# Patient Record
Sex: MALE | Race: WHITE
[De-identification: names, ages, dates, MRNs, and addresses within clinical notes are randomized per-mention and may not be internally consistent; named-entity substitution may affect disease eponyms.]

---

## 2019-01-07 ENCOUNTER — HOSPITAL ENCOUNTER (INPATIENT)
Dept: HOSPITAL 87 - ER | Age: 64
LOS: 3 days | Discharge: HOME | DRG: 720 | End: 2019-01-10
Attending: INTERNAL MEDICINE | Admitting: INTERNAL MEDICINE
Payer: SELF-PAY

## 2019-01-07 VITALS — WEIGHT: 251 LBS | BODY MASS INDEX: 39.39 KG/M2 | HEIGHT: 67 IN

## 2019-01-07 VITALS — DIASTOLIC BLOOD PRESSURE: 77 MMHG | SYSTOLIC BLOOD PRESSURE: 117 MMHG

## 2019-01-07 VITALS — DIASTOLIC BLOOD PRESSURE: 88 MMHG | SYSTOLIC BLOOD PRESSURE: 120 MMHG

## 2019-01-07 DIAGNOSIS — I21.4: ICD-10-CM

## 2019-01-07 DIAGNOSIS — E87.1: ICD-10-CM

## 2019-01-07 DIAGNOSIS — E83.51: ICD-10-CM

## 2019-01-07 DIAGNOSIS — E44.1: ICD-10-CM

## 2019-01-07 DIAGNOSIS — Z87.891: ICD-10-CM

## 2019-01-07 DIAGNOSIS — I25.10: ICD-10-CM

## 2019-01-07 DIAGNOSIS — E87.6: ICD-10-CM

## 2019-01-07 DIAGNOSIS — D63.8: ICD-10-CM

## 2019-01-07 DIAGNOSIS — A41.9: Primary | ICD-10-CM

## 2019-01-07 DIAGNOSIS — I25.2: ICD-10-CM

## 2019-01-07 DIAGNOSIS — J18.9: ICD-10-CM

## 2019-01-07 DIAGNOSIS — I11.0: ICD-10-CM

## 2019-01-07 DIAGNOSIS — I42.9: ICD-10-CM

## 2019-01-07 DIAGNOSIS — I50.40: ICD-10-CM

## 2019-01-07 DIAGNOSIS — Z79.4: ICD-10-CM

## 2019-01-07 DIAGNOSIS — E11.9: ICD-10-CM

## 2019-01-07 LAB
CHLORIDE SERPL-SCNC: 98 MEQ/L (ref 98–107)
CK MB SERPL-CCNC: 1.7 NG/ML (ref 0.5–3.6)
CK SERPL-CCNC: 223 IU/L (ref 39–308)
ERYTHROCYTE [DISTWIDTH] IN BLOOD BY AUTOMATED COUNT: 14.7 % (ref 11.6–14.6)
FOLATE SERPL-MCNC: 10.5 NG/ML (ref 5.38–?)
HCT VFR BLD AUTO: 35.4 % (ref 42–52)
HDLC SERPL-MCNC: 51 MG/DL (ref 40–59)
HGB BLD-MCNC: 11.9 G/DL (ref 14–18)
LDLC SERPL DIRECT ASSAY-MCNC: 63 MG/DL (ref 5–100)
LYMPHOCYTES NFR BLD MANUAL: 12 % (ref 20–50)
MCH RBC QN AUTO: 29.2 PG (ref 28–32)
MCV RBC AUTO: 86.9 FL (ref 80–94)
MONOCYTES NFR BLD MANUAL: 3 % (ref 2–8)
NEUTS BAND NFR BLD MANUAL: 12 % (ref 1–6)
NEUTS SEG NFR BLD MANUAL: 73 % (ref 45–75)
PLATELET # BLD AUTO: 239 X1000/UL (ref 130–400)
PLATELET # BLD EST: NORMAL 10*3/UL
PMV BLD AUTO: 8.8 FL (ref 7.4–10.4)
RBC # BLD AUTO: 4.08 MILL/UL (ref 4.7–6.1)
TIBC SERPL-MCNC: 391 UG/DL (ref 250–450)
VIT B12 SERPL-MCNC: 366 PG/ML (ref 211–911)

## 2019-01-07 PROCEDURE — 93005 ELECTROCARDIOGRAM TRACING: CPT

## 2019-01-07 PROCEDURE — 87804 INFLUENZA ASSAY W/OPTIC: CPT

## 2019-01-07 PROCEDURE — 96375 TX/PRO/DX INJ NEW DRUG ADDON: CPT

## 2019-01-07 PROCEDURE — 82607 VITAMIN B-12: CPT

## 2019-01-07 PROCEDURE — 80048 BASIC METABOLIC PNL TOTAL CA: CPT

## 2019-01-07 PROCEDURE — 36415 COLL VENOUS BLD VENIPUNCTURE: CPT

## 2019-01-07 PROCEDURE — 83036 HEMOGLOBIN GLYCOSYLATED A1C: CPT

## 2019-01-07 PROCEDURE — 93306 TTE W/DOPPLER COMPLETE: CPT

## 2019-01-07 PROCEDURE — 82962 GLUCOSE BLOOD TEST: CPT

## 2019-01-07 PROCEDURE — 80061 LIPID PANEL: CPT

## 2019-01-07 PROCEDURE — 99285 EMERGENCY DEPT VISIT HI MDM: CPT

## 2019-01-07 PROCEDURE — 71045 X-RAY EXAM CHEST 1 VIEW: CPT

## 2019-01-07 PROCEDURE — 93970 EXTREMITY STUDY: CPT

## 2019-01-07 PROCEDURE — 96374 THER/PROPH/DIAG INJ IV PUSH: CPT

## 2019-01-07 PROCEDURE — 93017 CV STRESS TEST TRACING ONLY: CPT

## 2019-01-07 PROCEDURE — 83550 IRON BINDING TEST: CPT

## 2019-01-07 PROCEDURE — 82553 CREATINE MB FRACTION: CPT

## 2019-01-07 PROCEDURE — 83540 ASSAY OF IRON: CPT

## 2019-01-07 PROCEDURE — 82550 ASSAY OF CK (CPK): CPT

## 2019-01-07 PROCEDURE — 84484 ASSAY OF TROPONIN QUANT: CPT

## 2019-01-07 PROCEDURE — 78452 HT MUSCLE IMAGE SPECT MULT: CPT

## 2019-01-07 PROCEDURE — 83880 ASSAY OF NATRIURETIC PEPTIDE: CPT

## 2019-01-07 PROCEDURE — 83605 ASSAY OF LACTIC ACID: CPT

## 2019-01-07 PROCEDURE — 82746 ASSAY OF FOLIC ACID SERUM: CPT

## 2019-01-07 RX ADMIN — METOPROLOL TARTRATE SCH MG: 25 TABLET ORAL at 20:27

## 2019-01-07 RX ADMIN — ENOXAPARIN SODIUM SCH MG: 30 INJECTION SUBCUTANEOUS at 20:02

## 2019-01-07 RX ADMIN — INSULIN LISPRO SCH UNIT: 100 INJECTION, SOLUTION INTRAVENOUS; SUBCUTANEOUS at 20:00

## 2019-01-07 RX ADMIN — FUROSEMIDE SCH MG: 10 INJECTION, SOLUTION INTRAMUSCULAR; INTRAVENOUS at 20:02

## 2019-01-07 RX ADMIN — SPIRONOLACTONE SCH MG: 25 TABLET ORAL at 20:31

## 2019-01-07 RX ADMIN — Medication SCH STRIP: at 20:03

## 2019-01-07 RX ADMIN — OXYCODONE HYDROCHLORIDE AND ACETAMINOPHEN SCH MG: 500 TABLET ORAL at 20:03

## 2019-01-07 RX ADMIN — Medication SCH STRIP: at 20:28

## 2019-01-07 RX ADMIN — INSULIN LISPRO SCH UNIT: 100 INJECTION, SOLUTION INTRAVENOUS; SUBCUTANEOUS at 20:29

## 2019-01-07 RX ADMIN — FAMOTIDINE SCH MG: 20 TABLET ORAL at 20:30

## 2019-01-08 VITALS — DIASTOLIC BLOOD PRESSURE: 76 MMHG | SYSTOLIC BLOOD PRESSURE: 111 MMHG

## 2019-01-08 VITALS — DIASTOLIC BLOOD PRESSURE: 81 MMHG | SYSTOLIC BLOOD PRESSURE: 112 MMHG

## 2019-01-08 VITALS — DIASTOLIC BLOOD PRESSURE: 68 MMHG | SYSTOLIC BLOOD PRESSURE: 107 MMHG

## 2019-01-08 VITALS — SYSTOLIC BLOOD PRESSURE: 110 MMHG | DIASTOLIC BLOOD PRESSURE: 79 MMHG

## 2019-01-08 VITALS — SYSTOLIC BLOOD PRESSURE: 122 MMHG | DIASTOLIC BLOOD PRESSURE: 69 MMHG

## 2019-01-08 VITALS — DIASTOLIC BLOOD PRESSURE: 73 MMHG | SYSTOLIC BLOOD PRESSURE: 103 MMHG

## 2019-01-08 LAB
CK MB SERPL-CCNC: 2.3 NG/ML (ref 0.5–3.6)
CK SERPL-CCNC: 246 IU/L (ref 39–308)

## 2019-01-08 RX ADMIN — INSULIN LISPRO SCH UNIT: 100 INJECTION, SOLUTION INTRAVENOUS; SUBCUTANEOUS at 05:56

## 2019-01-08 RX ADMIN — METOPROLOL TARTRATE SCH MG: 25 TABLET ORAL at 20:57

## 2019-01-08 RX ADMIN — FUROSEMIDE SCH MG: 10 INJECTION, SOLUTION INTRAMUSCULAR; INTRAVENOUS at 05:56

## 2019-01-08 RX ADMIN — SPIRONOLACTONE SCH MG: 25 TABLET ORAL at 09:12

## 2019-01-08 RX ADMIN — SPIRONOLACTONE SCH MG: 25 TABLET ORAL at 20:08

## 2019-01-08 RX ADMIN — OXYCODONE HYDROCHLORIDE AND ACETAMINOPHEN SCH MG: 500 TABLET ORAL at 20:08

## 2019-01-08 RX ADMIN — ASPIRIN SCH MG: 325 TABLET, COATED ORAL at 09:12

## 2019-01-08 RX ADMIN — INSULIN LISPRO SCH UNIT: 100 INJECTION, SOLUTION INTRAVENOUS; SUBCUTANEOUS at 17:34

## 2019-01-08 RX ADMIN — ENOXAPARIN SODIUM SCH MG: 120 INJECTION SUBCUTANEOUS at 21:03

## 2019-01-08 RX ADMIN — Medication SCH STRIP: at 05:56

## 2019-01-08 RX ADMIN — Medication SCH STRIP: at 17:34

## 2019-01-08 RX ADMIN — FUROSEMIDE SCH MG: 10 INJECTION, SOLUTION INTRAMUSCULAR; INTRAVENOUS at 17:15

## 2019-01-08 RX ADMIN — Medication SCH STRIP: at 13:06

## 2019-01-08 RX ADMIN — ENOXAPARIN SODIUM SCH MG: 30 INJECTION SUBCUTANEOUS at 09:11

## 2019-01-08 RX ADMIN — FAMOTIDINE SCH MG: 20 TABLET ORAL at 20:08

## 2019-01-08 RX ADMIN — FAMOTIDINE SCH MG: 20 TABLET ORAL at 09:12

## 2019-01-08 RX ADMIN — FUROSEMIDE SCH MG: 10 INJECTION, SOLUTION INTRAMUSCULAR; INTRAVENOUS at 17:48

## 2019-01-08 RX ADMIN — INSULIN LISPRO SCH UNIT: 100 INJECTION, SOLUTION INTRAVENOUS; SUBCUTANEOUS at 13:08

## 2019-01-08 RX ADMIN — OXYCODONE HYDROCHLORIDE AND ACETAMINOPHEN SCH MG: 500 TABLET ORAL at 09:12

## 2019-01-08 RX ADMIN — Medication SCH MLS/HR: at 20:25

## 2019-01-08 RX ADMIN — Medication SCH STRIP: at 20:57

## 2019-01-08 RX ADMIN — INSULIN LISPRO SCH UNIT: 100 INJECTION, SOLUTION INTRAVENOUS; SUBCUTANEOUS at 20:57

## 2019-01-08 RX ADMIN — METOPROLOL TARTRATE SCH MG: 25 TABLET ORAL at 09:11

## 2019-01-09 VITALS — SYSTOLIC BLOOD PRESSURE: 105 MMHG | DIASTOLIC BLOOD PRESSURE: 69 MMHG

## 2019-01-09 VITALS — SYSTOLIC BLOOD PRESSURE: 102 MMHG | DIASTOLIC BLOOD PRESSURE: 73 MMHG

## 2019-01-09 VITALS — DIASTOLIC BLOOD PRESSURE: 69 MMHG | SYSTOLIC BLOOD PRESSURE: 111 MMHG

## 2019-01-09 VITALS — DIASTOLIC BLOOD PRESSURE: 72 MMHG | SYSTOLIC BLOOD PRESSURE: 122 MMHG

## 2019-01-09 VITALS — SYSTOLIC BLOOD PRESSURE: 111 MMHG | DIASTOLIC BLOOD PRESSURE: 66 MMHG

## 2019-01-09 VITALS — SYSTOLIC BLOOD PRESSURE: 111 MMHG | DIASTOLIC BLOOD PRESSURE: 78 MMHG

## 2019-01-09 LAB
CK MB SERPL-CCNC: 1.4 NG/ML (ref 0.5–3.6)
CK SERPL-CCNC: 193 IU/L (ref 39–308)
INR PPP: 1.1
PROTHROMBIN TIME: 10.8 SEC (ref 9.1–11.1)

## 2019-01-09 RX ADMIN — INSULIN LISPRO SCH UNIT: 100 INJECTION, SOLUTION INTRAVENOUS; SUBCUTANEOUS at 21:07

## 2019-01-09 RX ADMIN — Medication SCH STRIP: at 12:39

## 2019-01-09 RX ADMIN — Medication SCH MLS/HR: at 22:38

## 2019-01-09 RX ADMIN — SPIRONOLACTONE SCH MG: 25 TABLET ORAL at 21:08

## 2019-01-09 RX ADMIN — ENOXAPARIN SODIUM SCH MG: 120 INJECTION SUBCUTANEOUS at 09:39

## 2019-01-09 RX ADMIN — OXYCODONE HYDROCHLORIDE AND ACETAMINOPHEN SCH MG: 500 TABLET ORAL at 09:37

## 2019-01-09 RX ADMIN — INSULIN LISPRO SCH UNIT: 100 INJECTION, SOLUTION INTRAVENOUS; SUBCUTANEOUS at 12:40

## 2019-01-09 RX ADMIN — ASPIRIN SCH MG: 325 TABLET, COATED ORAL at 09:38

## 2019-01-09 RX ADMIN — ENOXAPARIN SODIUM SCH MG: 120 INJECTION SUBCUTANEOUS at 10:49

## 2019-01-09 RX ADMIN — METOPROLOL TARTRATE SCH MG: 25 TABLET ORAL at 09:38

## 2019-01-09 RX ADMIN — FAMOTIDINE SCH MG: 20 TABLET ORAL at 09:37

## 2019-01-09 RX ADMIN — Medication SCH STRIP: at 17:10

## 2019-01-09 RX ADMIN — Medication SCH STRIP: at 06:12

## 2019-01-09 RX ADMIN — FUROSEMIDE SCH MG: 10 INJECTION, SOLUTION INTRAMUSCULAR; INTRAVENOUS at 06:14

## 2019-01-09 RX ADMIN — ENOXAPARIN SODIUM SCH MG: 120 INJECTION SUBCUTANEOUS at 21:08

## 2019-01-09 RX ADMIN — FUROSEMIDE SCH MG: 10 INJECTION, SOLUTION INTRAMUSCULAR; INTRAVENOUS at 18:30

## 2019-01-09 RX ADMIN — FAMOTIDINE SCH MG: 20 TABLET ORAL at 21:08

## 2019-01-09 RX ADMIN — METOPROLOL TARTRATE SCH MG: 25 TABLET ORAL at 21:08

## 2019-01-09 RX ADMIN — SPIRONOLACTONE SCH MG: 25 TABLET ORAL at 09:38

## 2019-01-09 RX ADMIN — INSULIN LISPRO SCH UNIT: 100 INJECTION, SOLUTION INTRAVENOUS; SUBCUTANEOUS at 06:12

## 2019-01-09 RX ADMIN — INSULIN LISPRO SCH UNIT: 100 INJECTION, SOLUTION INTRAVENOUS; SUBCUTANEOUS at 17:40

## 2019-01-09 RX ADMIN — Medication SCH STRIP: at 21:09

## 2019-01-09 RX ADMIN — OXYCODONE HYDROCHLORIDE AND ACETAMINOPHEN SCH MG: 500 TABLET ORAL at 21:08

## 2019-01-10 VITALS — DIASTOLIC BLOOD PRESSURE: 71 MMHG | SYSTOLIC BLOOD PRESSURE: 102 MMHG

## 2019-01-10 VITALS — SYSTOLIC BLOOD PRESSURE: 130 MMHG | DIASTOLIC BLOOD PRESSURE: 87 MMHG

## 2019-01-10 VITALS — SYSTOLIC BLOOD PRESSURE: 115 MMHG | DIASTOLIC BLOOD PRESSURE: 81 MMHG

## 2019-01-10 VITALS — DIASTOLIC BLOOD PRESSURE: 73 MMHG | SYSTOLIC BLOOD PRESSURE: 115 MMHG

## 2019-01-10 VITALS — DIASTOLIC BLOOD PRESSURE: 88 MMHG | SYSTOLIC BLOOD PRESSURE: 129 MMHG

## 2019-01-10 LAB
BASOPHILS NFR BLD AUTO: 0.7 % (ref 0–2)
CHLORIDE SERPL-SCNC: 101 MEQ/L (ref 98–107)
EOSINOPHIL NFR BLD AUTO: 4.8 % (ref 0–5)
ERYTHROCYTE [DISTWIDTH] IN BLOOD BY AUTOMATED COUNT: 14.6 % (ref 11.6–14.6)
HCT VFR BLD AUTO: 38.1 % (ref 42–52)
HGB BLD-MCNC: 12.6 G/DL (ref 14–18)
LYMPHOCYTES NFR BLD AUTO: 31.2 % (ref 20–50)
MCH RBC QN AUTO: 29 PG (ref 28–32)
MCV RBC AUTO: 87.7 FL (ref 80–94)
MONOCYTES NFR BLD AUTO: 9.9 % (ref 2–8)
NEUTROPHILS NFR BLD AUTO: 53.4 % (ref 40–76)
PLATELET # BLD AUTO: 284 X1000/UL (ref 130–400)
PMV BLD AUTO: 8.5 FL (ref 7.4–10.4)
RBC # BLD AUTO: 4.34 MILL/UL (ref 4.7–6.1)

## 2019-01-10 RX ADMIN — Medication SCH STRIP: at 06:00

## 2019-01-10 RX ADMIN — CEFTRIAXONE SCH MLS/HR: 1 INJECTION, SOLUTION INTRAVENOUS at 10:16

## 2019-01-10 RX ADMIN — ENOXAPARIN SODIUM SCH MG: 120 INJECTION SUBCUTANEOUS at 10:15

## 2019-01-10 RX ADMIN — FAMOTIDINE SCH MG: 20 TABLET ORAL at 09:00

## 2019-01-10 RX ADMIN — METOPROLOL TARTRATE SCH MG: 25 TABLET ORAL at 09:00

## 2019-01-10 RX ADMIN — Medication SCH STRIP: at 12:33

## 2019-01-10 RX ADMIN — ASPIRIN SCH MG: 325 TABLET, COATED ORAL at 09:00

## 2019-01-10 RX ADMIN — SPIRONOLACTONE SCH MG: 25 TABLET ORAL at 09:00

## 2019-01-10 RX ADMIN — OXYCODONE HYDROCHLORIDE AND ACETAMINOPHEN SCH MG: 500 TABLET ORAL at 09:00

## 2019-01-10 RX ADMIN — FUROSEMIDE SCH MG: 10 INJECTION, SOLUTION INTRAMUSCULAR; INTRAVENOUS at 05:53

## 2019-01-10 RX ADMIN — INSULIN LISPRO SCH UNIT: 100 INJECTION, SOLUTION INTRAVENOUS; SUBCUTANEOUS at 06:01

## 2020-03-02 ENCOUNTER — HOSPITAL ENCOUNTER (INPATIENT)
Dept: HOSPITAL 72 - EMR | Age: 65
LOS: 8 days | Discharge: NURSING, REHAB OR LONG-TERM CARE | DRG: 198 | End: 2020-03-10
Payer: MEDICAID

## 2020-03-02 VITALS — SYSTOLIC BLOOD PRESSURE: 152 MMHG | DIASTOLIC BLOOD PRESSURE: 92 MMHG

## 2020-03-02 VITALS — DIASTOLIC BLOOD PRESSURE: 89 MMHG | SYSTOLIC BLOOD PRESSURE: 148 MMHG

## 2020-03-02 VITALS — DIASTOLIC BLOOD PRESSURE: 96 MMHG | SYSTOLIC BLOOD PRESSURE: 168 MMHG

## 2020-03-02 VITALS — SYSTOLIC BLOOD PRESSURE: 149 MMHG | DIASTOLIC BLOOD PRESSURE: 93 MMHG

## 2020-03-02 VITALS — HEIGHT: 66 IN | WEIGHT: 213 LBS | BODY MASS INDEX: 34.23 KG/M2

## 2020-03-02 VITALS — SYSTOLIC BLOOD PRESSURE: 143 MMHG | DIASTOLIC BLOOD PRESSURE: 91 MMHG

## 2020-03-02 VITALS — DIASTOLIC BLOOD PRESSURE: 102 MMHG | SYSTOLIC BLOOD PRESSURE: 158 MMHG

## 2020-03-02 DIAGNOSIS — N20.0: ICD-10-CM

## 2020-03-02 DIAGNOSIS — K57.90: ICD-10-CM

## 2020-03-02 DIAGNOSIS — I50.23: ICD-10-CM

## 2020-03-02 DIAGNOSIS — E87.2: ICD-10-CM

## 2020-03-02 DIAGNOSIS — R74.0: ICD-10-CM

## 2020-03-02 DIAGNOSIS — I42.9: ICD-10-CM

## 2020-03-02 DIAGNOSIS — I11.0: ICD-10-CM

## 2020-03-02 DIAGNOSIS — R94.5: ICD-10-CM

## 2020-03-02 DIAGNOSIS — I24.9: Primary | ICD-10-CM

## 2020-03-02 DIAGNOSIS — I47.1: ICD-10-CM

## 2020-03-02 DIAGNOSIS — D62: ICD-10-CM

## 2020-03-02 DIAGNOSIS — K22.6: ICD-10-CM

## 2020-03-02 DIAGNOSIS — K76.0: ICD-10-CM

## 2020-03-02 LAB
ADD MANUAL DIFF: NO
ALBUMIN SERPL-MCNC: 3.3 G/DL (ref 3.4–5)
ALBUMIN/GLOB SERPL: 0.8 {RATIO} (ref 1–2.7)
ALP SERPL-CCNC: 86 U/L (ref 46–116)
ALT SERPL-CCNC: 369 U/L (ref 12–78)
ANION GAP SERPL CALC-SCNC: 29 MMOL/L (ref 5–15)
AST SERPL-CCNC: 485 U/L (ref 15–37)
BASOPHILS NFR BLD AUTO: 0.8 % (ref 0–2)
BILIRUB DIRECT SERPL-MCNC: 0.4 MG/DL (ref 0–0.3)
BILIRUB SERPL-MCNC: 1.6 MG/DL (ref 0.2–1)
BUN SERPL-MCNC: 16 MG/DL (ref 7–18)
CALCIUM SERPL-MCNC: 8.2 MG/DL (ref 8.5–10.1)
CHLORIDE SERPL-SCNC: 97 MMOL/L (ref 98–107)
CO2 SERPL-SCNC: 13 MMOL/L (ref 21–32)
CREAT SERPL-MCNC: 0.9 MG/DL (ref 0.55–1.3)
EOSINOPHIL NFR BLD AUTO: 0 % (ref 0–3)
ERYTHROCYTE [DISTWIDTH] IN BLOOD BY AUTOMATED COUNT: 14.7 % (ref 11.6–14.8)
GLOBULIN SER-MCNC: 4.2 G/DL
HCT VFR BLD CALC: 43.7 % (ref 42–52)
HGB BLD-MCNC: 14.5 G/DL (ref 14.2–18)
LYMPHOCYTES NFR BLD AUTO: 14.1 % (ref 20–45)
MCV RBC AUTO: 93 FL (ref 80–99)
MONOCYTES NFR BLD AUTO: 3.7 % (ref 1–10)
NEUTROPHILS NFR BLD AUTO: 81.4 % (ref 45–75)
PLATELET # BLD: 239 K/UL (ref 150–450)
POTASSIUM SERPL-SCNC: 3.4 MMOL/L (ref 3.5–5.1)
RBC # BLD AUTO: 4.71 M/UL (ref 4.7–6.1)
SODIUM SERPL-SCNC: 139 MMOL/L (ref 136–145)
WBC # BLD AUTO: 10.1 K/UL (ref 4.8–10.8)

## 2020-03-02 PROCEDURE — 86709 HEPATITIS A IGM ANTIBODY: CPT

## 2020-03-02 PROCEDURE — 76700 US EXAM ABDOM COMPLETE: CPT

## 2020-03-02 PROCEDURE — 87340 HEPATITIS B SURFACE AG IA: CPT

## 2020-03-02 PROCEDURE — 93017 CV STRESS TEST TRACING ONLY: CPT

## 2020-03-02 PROCEDURE — 94150 VITAL CAPACITY TEST: CPT

## 2020-03-02 PROCEDURE — 86803 HEPATITIS C AB TEST: CPT

## 2020-03-02 PROCEDURE — 71045 X-RAY EXAM CHEST 1 VIEW: CPT

## 2020-03-02 PROCEDURE — 85610 PROTHROMBIN TIME: CPT

## 2020-03-02 PROCEDURE — 94003 VENT MGMT INPAT SUBQ DAY: CPT

## 2020-03-02 PROCEDURE — 82962 GLUCOSE BLOOD TEST: CPT

## 2020-03-02 PROCEDURE — 78452 HT MUSCLE IMAGE SPECT MULT: CPT

## 2020-03-02 PROCEDURE — 74177 CT ABD & PELVIS W/CONTRAST: CPT

## 2020-03-02 PROCEDURE — 80053 COMPREHEN METABOLIC PANEL: CPT

## 2020-03-02 PROCEDURE — 36415 COLL VENOUS BLD VENIPUNCTURE: CPT

## 2020-03-02 PROCEDURE — 93306 TTE W/DOPPLER COMPLETE: CPT

## 2020-03-02 PROCEDURE — 96375 TX/PRO/DX INJ NEW DRUG ADDON: CPT

## 2020-03-02 PROCEDURE — 85730 THROMBOPLASTIN TIME PARTIAL: CPT

## 2020-03-02 PROCEDURE — 83735 ASSAY OF MAGNESIUM: CPT

## 2020-03-02 PROCEDURE — 84100 ASSAY OF PHOSPHORUS: CPT

## 2020-03-02 PROCEDURE — 85025 COMPLETE CBC W/AUTO DIFF WBC: CPT

## 2020-03-02 PROCEDURE — 82248 BILIRUBIN DIRECT: CPT

## 2020-03-02 PROCEDURE — 87081 CULTURE SCREEN ONLY: CPT

## 2020-03-02 PROCEDURE — 99285 EMERGENCY DEPT VISIT HI MDM: CPT

## 2020-03-02 PROCEDURE — 83880 ASSAY OF NATRIURETIC PEPTIDE: CPT

## 2020-03-02 PROCEDURE — 96374 THER/PROPH/DIAG INJ IV PUSH: CPT

## 2020-03-02 PROCEDURE — 84484 ASSAY OF TROPONIN QUANT: CPT

## 2020-03-02 PROCEDURE — 93970 EXTREMITY STUDY: CPT

## 2020-03-02 PROCEDURE — 93005 ELECTROCARDIOGRAM TRACING: CPT

## 2020-03-02 PROCEDURE — 96376 TX/PRO/DX INJ SAME DRUG ADON: CPT

## 2020-03-02 PROCEDURE — 86705 HEP B CORE ANTIBODY IGM: CPT

## 2020-03-02 PROCEDURE — 83690 ASSAY OF LIPASE: CPT

## 2020-03-02 RX ADMIN — LORAZEPAM PRN MG: 2 INJECTION, SOLUTION INTRAMUSCULAR; INTRAVENOUS at 23:37

## 2020-03-02 RX ADMIN — NITROGLYCERIN PRN MG: 0.4 TABLET SUBLINGUAL at 18:19

## 2020-03-02 RX ADMIN — NITROGLYCERIN PRN MG: 0.4 TABLET SUBLINGUAL at 18:14

## 2020-03-02 RX ADMIN — SODIUM CHLORIDE PRN MG: 9 INJECTION, SOLUTION INTRAVENOUS at 21:54

## 2020-03-02 RX ADMIN — HEPARIN SODIUM SCH UNITS: 5000 INJECTION INTRAVENOUS; SUBCUTANEOUS at 21:52

## 2020-03-02 RX ADMIN — DEXTROSE AND SODIUM CHLORIDE SCH MLS/HR: 5; .45 INJECTION, SOLUTION INTRAVENOUS at 21:00

## 2020-03-02 RX ADMIN — DOCUSATE SODIUM SCH MG: 100 CAPSULE, LIQUID FILLED ORAL at 21:52

## 2020-03-02 NOTE — DIAGNOSTIC IMAGING REPORT
Indication:  Abdominal pain

 

Technique: Grayscale and duplex Doppler imaging of the abdomen performed.

 

Comparison: None

 

Findings: Exam is limited by body habitus and bowel gas.

 

The liver is echogenic consistent with fatty infiltration. Doppler interrogation of

the main portal vein shows patency with hepatopedal, monophasic flow. There is no

biliary ductal dilatation identified. Gallbladder is unremarkable. No obvious stones

identified in the gallbladder.

 

Pancreas aorta and IVC are not seen.

 

There is no hydronephrosis. There may be a small stone in the right kidney

nonobstructive.

 

IMPRESSION:

 

Fatty liver

 

Suspected nonobstructive stone right kidney.

 

Limited evaluation due to bowel gas

## 2020-03-02 NOTE — DIAGNOSTIC IMAGING REPORT
Indication: Dyspnea

 

Comparison:  None

 

A single view chest radiograph was obtained.

 

Findings:

 

No definite infiltrate or pulmonary vascular congestion identified. Single lead

pacemaker demonstrated in the left anterior chest wall. The heart is enlarged. The

aorta is mildly enlarged consistent with atherosclerotic vascular disease.  The bones

are osteopenic. There are thoracic vertebral enthesophytes at multiple levels.

 

Impression:

 

No acute disease

## 2020-03-02 NOTE — EMERGENCY ROOM REPORT
History of Present Illness


General


Chief Complaint:  Chest Pain





Present Illness


HPI


Patient is a 67-year-old male presents after increased chest pain for the past 

3 days.  Prior history of congestive heart failure and pacemaker placement.  No 

prior history of coronary artery disease.  He normally takes carvedilol as well 

as Lasix twice a day.  Reports having increased pain at night.  States that he 

had been having some increased difficulty with breathing.  He had been given 

nitroglycerin as well as aspirin by paramedics minimal improvement.  Stepped 

from a bus.  He is normally ambulatory with a walker.Patient had onset of 

symptoms yesterday.  Patient had reportedly been compliant with his medications 

which include Lasix as well as medications for high cholesterol and 

hypertension.


Allergies:  


Coded Allergies:  


     No Known Allergies (Unverified , 3/2/20)





Patient History


Past Medical History:  see triage record


Past Surgical History:  pacemaker


Reviewed Nursing Documentation:  PMH: Agreed; PSxH: Agreed





Review of Systems


All Other Systems:  negative except mentioned in HPI





Physical Exam





Vital Signs








  Date Time  Temp Pulse Resp B/P (MAP) Pulse Ox O2 Delivery O2 Flow Rate FiO2


 


3/2/20 15:07 98.2 110 16 168/96 (120) 96 Room Air  








Sp02 EP Interpretation:  reviewed, normal


General Appearance:  alert, GCS 15, obese


Head:  atraumatic


ENT:  normal ENT inspection, hearing grossly normal, normal voice


Neck:  normal inspection, full range of motion, supple, no bony tend


Respiratory:  normal inspection, no respiratory distress, no retraction


Cardiovascular #1:  regular rate, rhythm, no edema


Gastrointestinal:  normal inspection, normal bowel sounds, non tender, soft, no 

guarding, no hernia


Genitourinary:  no CVA tenderness


Musculoskeletal:  normal inspection, back normal, normal range of motion


Neurologic:  alert, motor strength/tone normal, CNs III-XII nml as tested, 

oriented x3, responsive, speech normal, normal inspection


Psychiatric:  normal inspection, judgement/insight normal, mood/affect normal





Medical Decision Making


Diagnostic Impression:  


 Primary Impression:  


 Chest pain


 Additional Impressions:  


 Abnormal LFTs


 Metabolic acidosis


 Elevated troponin


ER Course


Patient presented for chest pain.  Differential diagnosis included but was not 

limited to acute coronary syndrome, pulmonary embolism, pneumonia, aortic 

dissection, shingles, pneumothorax, aortic dissection, esophageal rupture, 

pericarditis.  Because of complexity of patient's case laboratory tests and 

imaging studies were ordered.





EKG showed Sinus tachycardia with a rate of 105 without acute ST or T wave 

changes.


CXR showed cardiomegaly without infiltrate.  





Patient was given lasix and aspirin.  


Dr. Webster was contacted for admission due to covering physician for Dr. Krishnamurthy.





Labs








Test


  3/2/20


15:45 3/2/20


18:35


 


White Blood Count


  10.1 K/UL


(4.8-10.8) 


 


 


Red Blood Count


  4.71 M/UL


(4.70-6.10) 


 


 


Hemoglobin


  14.5 G/DL


(14.2-18.0) 


 


 


Hematocrit


  43.7 %


(42.0-52.0) 


 


 


Mean Corpuscular Volume 93 FL (80-99)  


 


Mean Corpuscular Hemoglobin


  30.7 PG


(27.0-31.0) 


 


 


Mean Corpuscular Hemoglobin


Concent 33.1 G/DL


(32.0-36.0) 


 


 


Red Cell Distribution Width


  14.7 %


(11.6-14.8) 


 


 


Platelet Count


  239 K/UL


(150-450) 


 


 


Mean Platelet Volume


  6.2 FL


(6.5-10.1) 


 


 


Neutrophils (%) (Auto)


  81.4 %


(45.0-75.0) 


 


 


Lymphocytes (%) (Auto)


  14.1 %


(20.0-45.0) 


 


 


Monocytes (%) (Auto)


  3.7 %


(1.0-10.0) 


 


 


Eosinophils (%) (Auto)


  0.0 %


(0.0-3.0) 


 


 


Basophils (%) (Auto)


  0.8 %


(0.0-2.0) 


 


 


Sodium Level


  139 MMOL/L


(136-145) 


 


 


Potassium Level


  3.4 MMOL/L


(3.5-5.1) 


 


 


Chloride Level


  97 MMOL/L


() 


 


 


Carbon Dioxide Level


  13 MMOL/L


(21-32) 


 


 


Anion Gap


  29 mmol/L


(5-15) 


 


 


Blood Urea Nitrogen


  16 mg/dL


(7-18) 


 


 


Creatinine


  0.9 MG/DL


(0.55-1.30) 


 


 


Estimat Glomerular Filtration


Rate > 60 mL/min


(>60) 


 


 


Glucose Level


  154 MG/DL


() 


 


 


Calcium Level


  8.2 MG/DL


(8.5-10.1) 


 


 


Total Bilirubin


  1.6 MG/DL


(0.2-1.0) 


 


 


Direct Bilirubin


  0.4 MG/DL


(0.0-0.3) 


 


 


Aspartate Amino Transf


(AST/SGOT) 485 U/L


(15-37) 


 


 


Alanine Aminotransferase


(ALT/SGPT) 369 U/L


(12-78) 


 


 


Alkaline Phosphatase


  86 U/L


() 


 


 


Pro-B-Type Natriuretic Peptide


  904 pg/mL


(0-125) 


 


 


Total Protein


  7.5 G/DL


(6.4-8.2) 


 


 


Albumin


  3.3 G/DL


(3.4-5.0) 


 


 


Globulin 4.2 g/dL  


 


Albumin/Globulin Ratio 0.8 (1.0-2.7)  


 


Lipase


  328 U/L


() 


 


 


Troponin I


  


  0.117 ng/mL


(0.000-0.056)








EKG Diagnostic Results


Rate:  normal


Rhythm:  NSR


ST Segments:  no acute changes





Last Vital Signs








  Date Time  Temp Pulse Resp B/P (MAP) Pulse Ox O2 Delivery O2 Flow Rate FiO2


 


3/2/20 15:07 98.2 110 16 168/96 (120) 96 Room Air  








Status:  improved


Disposition:  ADMITTED AS INPATIENT


Condition:  Stable


Scripts


Unable to Obtain Active Prescriptions or Reported Meds











Faheem Kinney MD Mar 2, 2020 15:12

## 2020-03-03 VITALS — DIASTOLIC BLOOD PRESSURE: 81 MMHG | SYSTOLIC BLOOD PRESSURE: 134 MMHG

## 2020-03-03 VITALS — DIASTOLIC BLOOD PRESSURE: 84 MMHG | SYSTOLIC BLOOD PRESSURE: 139 MMHG

## 2020-03-03 VITALS — DIASTOLIC BLOOD PRESSURE: 79 MMHG | SYSTOLIC BLOOD PRESSURE: 134 MMHG

## 2020-03-03 VITALS — DIASTOLIC BLOOD PRESSURE: 113 MMHG | SYSTOLIC BLOOD PRESSURE: 160 MMHG

## 2020-03-03 VITALS — DIASTOLIC BLOOD PRESSURE: 77 MMHG | SYSTOLIC BLOOD PRESSURE: 133 MMHG

## 2020-03-03 VITALS — SYSTOLIC BLOOD PRESSURE: 146 MMHG | DIASTOLIC BLOOD PRESSURE: 98 MMHG

## 2020-03-03 LAB
ADD MANUAL DIFF: NO
ALBUMIN SERPL-MCNC: 3.5 G/DL (ref 3.4–5)
ALBUMIN/GLOB SERPL: 0.8 {RATIO} (ref 1–2.7)
ALP SERPL-CCNC: 79 U/L (ref 46–116)
ALT SERPL-CCNC: 294 U/L (ref 12–78)
ANION GAP SERPL CALC-SCNC: 19 MMOL/L (ref 5–15)
APTT BLD: 25 SEC (ref 23–33)
AST SERPL-CCNC: 313 U/L (ref 15–37)
BASOPHILS NFR BLD AUTO: 1.7 % (ref 0–2)
BILIRUB DIRECT SERPL-MCNC: 0.3 MG/DL (ref 0–0.3)
BILIRUB SERPL-MCNC: 1.7 MG/DL (ref 0.2–1)
BUN SERPL-MCNC: 10 MG/DL (ref 7–18)
CALCIUM SERPL-MCNC: 8.5 MG/DL (ref 8.5–10.1)
CHLORIDE SERPL-SCNC: 97 MMOL/L (ref 98–107)
CO2 SERPL-SCNC: 22 MMOL/L (ref 21–32)
CREAT SERPL-MCNC: 0.7 MG/DL (ref 0.55–1.3)
EOSINOPHIL NFR BLD AUTO: 0.7 % (ref 0–3)
ERYTHROCYTE [DISTWIDTH] IN BLOOD BY AUTOMATED COUNT: 12.7 % (ref 11.6–14.8)
GLOBULIN SER-MCNC: 4.4 G/DL
HCT VFR BLD CALC: 43.6 % (ref 42–52)
HGB BLD-MCNC: 15.1 G/DL (ref 14.2–18)
INR PPP: 1 (ref 0.9–1.1)
LYMPHOCYTES NFR BLD AUTO: 41.7 % (ref 20–45)
MCV RBC AUTO: 90 FL (ref 80–99)
MONOCYTES NFR BLD AUTO: 12.2 % (ref 1–10)
NEUTROPHILS NFR BLD AUTO: 43.8 % (ref 45–75)
PLATELET # BLD: 223 K/UL (ref 150–450)
POTASSIUM SERPL-SCNC: 3.5 MMOL/L (ref 3.5–5.1)
RBC # BLD AUTO: 4.85 M/UL (ref 4.7–6.1)
SODIUM SERPL-SCNC: 138 MMOL/L (ref 136–145)
WBC # BLD AUTO: 6.4 K/UL (ref 4.8–10.8)

## 2020-03-03 RX ADMIN — PANTOPRAZOLE SODIUM SCH MG: 40 INJECTION, POWDER, FOR SOLUTION INTRAVENOUS at 09:14

## 2020-03-03 RX ADMIN — DEXTROSE AND SODIUM CHLORIDE SCH MLS/HR: 5; .45 INJECTION, SOLUTION INTRAVENOUS at 23:58

## 2020-03-03 RX ADMIN — CARVEDILOL SCH MG: 6.25 TABLET, FILM COATED ORAL at 09:15

## 2020-03-03 RX ADMIN — DEXTROSE AND SODIUM CHLORIDE SCH MLS/HR: 5; .45 INJECTION, SOLUTION INTRAVENOUS at 09:25

## 2020-03-03 RX ADMIN — LORAZEPAM PRN MG: 2 INJECTION, SOLUTION INTRAMUSCULAR; INTRAVENOUS at 06:10

## 2020-03-03 RX ADMIN — DOCUSATE SODIUM SCH MG: 100 CAPSULE, LIQUID FILLED ORAL at 21:04

## 2020-03-03 RX ADMIN — DIPHENHYDRAMINE HYDROCHLORIDE PRN MG: 50 INJECTION INTRAMUSCULAR; INTRAVENOUS at 01:26

## 2020-03-03 RX ADMIN — SODIUM CHLORIDE PRN MG: 9 INJECTION, SOLUTION INTRAVENOUS at 21:05

## 2020-03-03 RX ADMIN — LOSARTAN POTASSIUM SCH MG: 25 TABLET, FILM COATED ORAL at 09:15

## 2020-03-03 RX ADMIN — FUROSEMIDE SCH MG: 40 TABLET ORAL at 09:15

## 2020-03-03 RX ADMIN — DOCUSATE SODIUM SCH MG: 100 CAPSULE, LIQUID FILLED ORAL at 09:14

## 2020-03-03 RX ADMIN — HEPARIN SODIUM SCH UNITS: 5000 INJECTION INTRAVENOUS; SUBCUTANEOUS at 09:16

## 2020-03-03 NOTE — GI INITIAL CONSULT NOTE
History of Present Illness


General


Date patient seen:  Mar 3, 2020


Time patient seen:  10:59


Reason for Hospitalization:  Chest Pain


Referring physician:  LAKISHA


Reason for Consultation:  ABNORMAL LFTs





Present Illness


HPI


Patient is a 67-year-old male presents after increased chest pain for the past 

3 days.  Prior history of congestive heart failure and pacemaker placement.  No 

prior history of coronary artery disease.  He normally takes carvedilol as well 

as Lasix twice a day.  Reports having increased pain at night.  States that he 

had been having some increased difficulty with breathing.  He had been given 

nitroglycerin as well as aspirin by paramedics minimal improvement.  Stepped 

from a bus.  He is normally ambulatory with a walker.Patient had onset of 

symptoms yesterday.  Patient had reportedly been compliant with his medications 

which include Lasix as well as medications for high cholesterol and 

hypertension.


GI consulted for reported abnormal LFTs.  Patient seen, awake alert oriented x4 

no apparent distress.  The patient had complaint of chest pain which has 

improved.  The patient has no active signs or symptoms of any nausea vomiting.  

Denied any constipation or diarrhea.  The patient did state he had a history of 

endoscopy, but unsure of exact date.  The patient had an abdominal ultrasound 

performed however the aorta, IVC and pancreas was not visualized secondary to 

overlying bowel gas and the patient's body habitus.  Steatosis of the liver was 

present.  No abdominal fluid collections noted.  Labs reviewed note that the 

patient's total bilirubin 1.7 , , troponin 0.117.


Home Meds


Unable to Obtain Active Prescriptions or Reported Meds


Med list reviewed/reconciled:  Yes


Allergies:  


Coded Allergies:  


     No Known Allergies (Unverified , 3/2/20)





Patient History


PMH Narrative


Past Medical History:  see triage record


Past Surgical History:  pacemaker


Reviewed Nursing Documentation:  PMH: Agreed; PSxH: Agreed





Review of Systems


All Other Systems:  negative except mentioned in HPI





Physical Exam





Vital Signs








  Date Time  Temp Pulse Resp B/P (MAP) Pulse Ox O2 Delivery O2 Flow Rate FiO2


 


3/2/20 15:07 98.2 110 16 168/96 (120) 96 Room Air  








Sp02 EP Interpretation:  reviewed, normal


Labs





Laboratory Tests








Test


  3/2/20


15:45 3/2/20


18:35 3/3/20


07:40


 


White Blood Count


  10.1 K/UL


(4.8-10.8) 


  6.4 K/UL


(4.8-10.8)


 


Red Blood Count


  4.71 M/UL


(4.70-6.10) 


  4.85 M/UL


(4.70-6.10)


 


Hemoglobin


  14.5 G/DL


(14.2-18.0) 


  15.1 G/DL


(14.2-18.0)


 


Hematocrit


  43.7 %


(42.0-52.0) 


  43.6 %


(42.0-52.0)


 


Mean Corpuscular Volume 93 FL (80-99)    90 FL (80-99)  


 


Mean Corpuscular Hemoglobin


  30.7 PG


(27.0-31.0) 


  31.2 PG


(27.0-31.0)  H


 


Mean Corpuscular Hemoglobin


Concent 33.1 G/DL


(32.0-36.0) 


  34.7 G/DL


(32.0-36.0)


 


Red Cell Distribution Width


  14.7 %


(11.6-14.8) 


  12.7 %


(11.6-14.8)


 


Platelet Count


  239 K/UL


(150-450) 


  223 K/UL


(150-450)


 


Mean Platelet Volume


  6.2 FL


(6.5-10.1)  L 


  5.7 FL


(6.5-10.1)  L


 


Neutrophils (%) (Auto)


  81.4 %


(45.0-75.0)  H 


  43.8 %


(45.0-75.0)  L


 


Lymphocytes (%) (Auto)


  14.1 %


(20.0-45.0)  L 


  41.7 %


(20.0-45.0)


 


Monocytes (%) (Auto)


  3.7 %


(1.0-10.0) 


  12.2 %


(1.0-10.0)  H


 


Eosinophils (%) (Auto)


  0.0 %


(0.0-3.0) 


  0.7 %


(0.0-3.0)


 


Basophils (%) (Auto)


  0.8 %


(0.0-2.0) 


  1.7 %


(0.0-2.0)


 


Sodium Level


  139 MMOL/L


(136-145) 


  138 MMOL/L


(136-145)


 


Potassium Level


  3.4 MMOL/L


(3.5-5.1)  L 


  3.5 MMOL/L


(3.5-5.1)


 


Chloride Level


  97 MMOL/L


()  L 


  97 MMOL/L


()  L


 


Carbon Dioxide Level


  13 MMOL/L


(21-32)  L 


  22 MMOL/L


(21-32)


 


Anion Gap


  29 mmol/L


(5-15)  H 


  19 mmol/L


(5-15)  H


 


Blood Urea Nitrogen


  16 mg/dL


(7-18) 


  10 mg/dL


(7-18)


 


Creatinine


  0.9 MG/DL


(0.55-1.30) 


  0.7 MG/DL


(0.55-1.30)


 


Estimat Glomerular Filtration


Rate > 60 mL/min


(>60) 


  > 60 mL/min


(>60)


 


Glucose Level


  154 MG/DL


()  H 


  141 MG/DL


()  H


 


Calcium Level


  8.2 MG/DL


(8.5-10.1)  L 


  8.5 MG/DL


(8.5-10.1)


 


Total Bilirubin


  1.6 MG/DL


(0.2-1.0)  H 


  1.7 MG/DL


(0.2-1.0)  H


 


Direct Bilirubin


  0.4 MG/DL


(0.0-0.3)  H 


  0.3 MG/DL


(0.0-0.3)


 


Aspartate Amino Transf


(AST/SGOT) 485 U/L


(15-37)  H 


  313 U/L


(15-37)  H


 


Alanine Aminotransferase


(ALT/SGPT) 369 U/L


(12-78)  H 


  294 U/L


(12-78)  H


 


Alkaline Phosphatase


  86 U/L


() 


  79 U/L


()


 


Troponin I


  0.109 ng/mL


(0.000-0.056) 0.117 ng/mL


(0.000-0.056) 


 


 


Pro-B-Type Natriuretic Peptide


  904 pg/mL


(0-125)  H 


  


 


 


Total Protein


  7.5 G/DL


(6.4-8.2) 


  7.9 G/DL


(6.4-8.2)


 


Albumin


  3.3 G/DL


(3.4-5.0)  L 


  3.5 G/DL


(3.4-5.0)


 


Globulin 4.2 g/dL    4.4 g/dL  


 


Albumin/Globulin Ratio


  0.8 (1.0-2.7)


L 


  0.8 (1.0-2.7)


L


 


Lipase


  328 U/L


() 


  


 








General Appearance:  well appearing, no apparent distress, alert


Head:  normocephalic


EENT:  PERRL/EOMI, normal ENT inspection


Neck:  supple


Respiratory:  normal breath sounds, no respiratory distress


Cardiovascular:  normal rate


Gastrointestinal:  normal inspection, non tender, soft, normal bowel sounds, non

-distended


Rectal:  deferred


Genitourinary:  deferred


Musculoskeletal:  normal inspection, back normal


Neurologic:  alert, oriented x3, responsive, normal inspection


Psychiatric:  normal inspection, judgement/insight normal, memory normal


Skin:  normal inspection, normal color, no rash, warm/dry, palpation normal, 

well hydrated


Lymphatic:  normal inspection, no adenopathy


Current Medications





Current Medications








 Medications


  (Trade)  Dose


 Ordered  Sig/Sharon


 Route


 PRN Reason  Start Time


 Stop Time Status Last Admin


Dose Admin


 


 Acetaminophen


  (Tylenol)  650 mg  Q4H  PRN


 ORAL


 Mild Pain (Pain Scale 1-3)  3/2/20 20:02


 4/1/20 20:01   


 


 


 Barium Sulfate


  (Readi-Cat 2)  450 ml  NOW  PRN


 ORAL


 Radiology Procedure  3/3/20 10:30


 3/5/20 10:28   


 


 


 Carvedilol


  (Coreg)  6.25 mg  DAILY


 ORAL


   3/3/20 09:00


 4/2/20 08:59  3/3/20 09:15


 


 


 Dextrose


  (Dextrose 50%)  25 ml  Q30M  PRN


 IV


 Hypoglycemia  3/2/20 20:02


 4/1/20 20:01   


 


 


 Dextrose


  (Dextrose 50%)  50 ml  Q30M  PRN


 IV


 Hypoglycemia  3/2/20 20:02


 4/1/20 20:01   


 


 


 Dextrose/Sodium


 Chloride  1,000 ml @ 


 75 mls/hr  [id number]


 IV


   3/2/20 21:00


 4/1/20 20:59  3/3/20 09:25


 


 


 Diphenhydramine


 HCl


  (Benadryl)  25 mg  Q6H  PRN


 ORAL


 Itching/Pruritis  3/2/20 20:02


 4/1/20 20:01   


 


 


 Docusate Sodium


  (Colace)  100 mg  EVERY 12  HOURS


 ORAL


   3/2/20 21:00


 4/1/20 20:59  3/3/20 09:14


 


 


 Furosemide


  (Lasix)  40 mg  DAILY


 ORAL


   3/3/20 09:00


 4/2/20 08:59  3/3/20 09:15


 


 


 Heparin Sodium


  (Porcine)


  (Heparin 5000


 units/ml)  5,000 units  EVERY 12  HOURS


 SUBQ


   3/2/20 21:00


 4/1/20 20:59  3/3/20 09:16


 


 


 Hydromorphone HCl


  (Dilaudid)  0.5 mg  Q4H  PRN


 IVP


 Moderate Pain (Pain Scale 4-6)  3/2/20 20:07


 3/9/20 20:06  3/2/20 21:54


 


 


 Hydromorphone HCl


  (Dilaudid)  1 mg  Q4H  PRN


 IVP


 Severe Pain (Pain Scale 7-10)  3/2/20 20:07


 3/9/20 20:06  3/3/20 01:26


 


 


 Iohexol


  (OMNIPAQUE-300


 100ml)  100 ml  NOW  PRN


 INJ


 Radiology Procedure  3/3/20 10:30


 3/5/20 10:28   


 


 


 Lorazepam


  (Ativan 2mg/ml


 1ml)  1 mg  Q4H  PRN


 IV


 For Anxiety  3/3/20 09:00


 3/10/20 08:59  3/3/20 09:17


 


 


 Losartan Potassium


  (Cozaar)  25 mg  DAILY


 ORAL


   3/3/20 09:00


 4/2/20 08:59  3/3/20 09:15


 


 


 Nitroglycerin


  (Ntg)  0.4 mg  Q5M X 3 DOSES PRN


 SL


 Prn Chest Pain  3/2/20 20:02


 4/1/20 20:01  3/3/20 09:17


 


 


 Ondansetron HCl


  (Zofran)  4 mg  Q6H  PRN


 IVP


 Nausea & Vomiting  3/2/20 20:02


 4/1/20 20:01  3/2/20 21:52


 


 


 Pantoprazole


  (Protonix)  40 mg  DAILY


 IV


   3/3/20 09:00


 4/2/20 08:59  3/3/20 09:14


 











GI: Plan


Problems:  


(1) Abnormal LFTs


(2) Chest pain


(3) Elevated troponin


(4) Metabolic acidosis


Plan


Abdominal ultrasound review >> unable to visualize given patient's body habitus 

and overlying gas


Will obtain abdominal pelvis CT to evaluate hepatocellular versus cholestatic 

disease


Transaminitis could also be elevated due to hepatic congestion given the 

patient has prior history of CHF


Will obtain hepatitis panel to rule out any viral infection


No plans for any GI procedures at this time given elevated troponin levels


Follow-up cardiology recommendations


We will consider GI procedures if needed


Repeat liver function test for tomorrow


Advance diet as tolerated


PPI


We will follow on a daily basis with any additional recommendations





Discussed with Dr. Elizabeth.


Thank you for this patient referral, we will follow.





The patient was seen and examined at bedside and all new and available data was 

reviewed in the patients chart. I agree with the above findings, impression 

and plan.  (Patient seen earlier today. Signature stamp does not reflect 

patient encounter time.). - MD Amanda EscobarCopper Queen Community HospitalKevin NP Mar 3, 2020 11:05

## 2020-03-03 NOTE — HISTORY AND PHYSICAL
History of Present Illness


General


Date patient seen:  Mar 3, 2020


Time patient seen:  09:25


Reason for Hospitalization:  Chest Pain





Present Illness


HPI


Mr. Yarbrough is a 67 year old male with hx of systolic CHF, s/p PPM, presenting 

with chest pain, abdominal pain, nausea that started last night. He reports 

taking all his medications including lasix and coreg, last drink was ~1 week 

ago. He was in normal state of health until yesterday late afternoon when he 

had sudden onset of symptoms.


Allergies:  


Coded Allergies:  


     No Known Allergies (Unverified , 3/2/20)





Medication History


Unable to Obtain Active Prescriptions or Reported Meds





Patient History


Healthcare decision maker


N


Resuscitation status


Full Code


Advanced Directive on File








Review of Systems


Constitutional:  Denies: no symptoms, see HPI, chills, sweats, fever, malaise, 

weakness, other


Eye:  Denies: no symptoms, see HPI, eye pain, blurred vision, tearing, double 

vision, nose pain, nose congestion, acuity changes, discharge, other


ENT:  Denies: no symptoms, see HPI, ear pain, ear discharge, nose pain, nose 

congestion, throat pain, throat swelling, mouth pain, hearing loss, nasal 

discharge, other


Respiratory:  Denies: no symptoms, see HPI, cough, orthopnea, shortness of 

breath, stridor, wheezing, SAHNI, sputum, other


Cardiovascular:  Reports: chest pain


Gastrointestinal:  Reports: abdominal pain


Genitourinary:  Denies: no symptoms, see HPI, discharge, dysuria, frequency, 

hematuria, pain, retention, incontinence, urgency, vag bleed/dc, other


Musculoskeletal:  Denies: no symptoms, see HPI, back pain, gout, joint pain, 

joint swelling, muscle pain, muscle stiffness, other


Skin:  Denies: no symptoms, see HPI, rash, change in color, change in hair/nails

, dryness, lesions, other


Psychiatric:  Denies: no symptoms, see HPI, prior hx, anxiety, depressed 

feelings, emotional problems, SI, HI, hallucinations, other


Neurological:  Denies: no symptoms, see HPI, headache, numbness, paresthesia, 

seizure, tingling, tremors, focal weakness, syncope, dizziness, other


Endocrine:  Denies: no symptoms, see HPI, excessive sweating, flushing, 

intolerance to temperature, increased thirst, increased urine, unexplained 

weight loss, other


Hematologic/Lymphatic:  Denies: no symptoms, see HPI, anemia, blood clots, easy 

bleeding, easy bruising, swollen glands, diathesis, other





Physical Exam


General Appearance:  alert, mild distress


HEENT:  normocephalic, atraumatic


Neck:  supple


Respiratory/Chest:  lungs clear, normal breath sounds


Cardiovascular/Chest:  normal rate, regular rhythm


Abdomen:  non tender, soft


Extremities:  no edema


Neurologic:  alert, oriented x 3





Last 24 Hour Vital Signs








  Date Time  Temp Pulse Resp B/P (MAP) Pulse Ox O2 Delivery O2 Flow Rate FiO2


 


3/3/20 12:00 97.7 83 20 146/98 (114) 95   


 


3/3/20 09:17    160/113    


 


3/3/20 09:15    160/113    


 


3/3/20 09:15  97  160/113    


 


3/3/20 09:00      Room Air  


 


3/3/20 08:00  91      


 


3/3/20 08:00 98.0 97 20 160/113 (129) 96   


 


3/3/20 04:00 97.9 94 18 134/79 (97) 97   


 


3/3/20 04:00  96      


 


3/3/20 01:56 98.0       


 


3/3/20 00:00  86      


 


3/3/20 00:00 98.0 87 18 139/84 (102) 98   


 


3/2/20 22:24 98.2       


 


3/2/20 21:30      Room Air  


 


3/2/20 21:19  90      


 


3/2/20 21:15 97.7 98 20 158/102 (120) 94   


 


3/2/20 21:10 98.2 88 16 149/93 98 Room Air  


 


3/2/20 20:40 98.2 88 16 149/93 98 Room Air  


 


3/2/20 19:30 98.2 89 16 148/89 98 Room Air  


 


3/2/20 19:26 98.2       


 


3/2/20 19:00  96  152/92    


 


3/2/20 18:54 98.2 96 16 152/92 98 Room Air  


 


3/2/20 18:19    143/91    


 


3/2/20 18:14    143/91    


 


3/2/20 18:14 98.2 87 16 143/91 98 Room Air  


 


3/2/20 16:17 98.2       


 


3/2/20 16:10  105 16   Room Air  


 


3/2/20 16:10 98.2 105 16 168/96 96 Room Air  


 


3/2/20 15:33  110  168/96    


 


3/2/20 15:07 98.2 110 16 168/96 (120) 96 Room Air  

















Intake and Output  


 


 3/2/20 3/3/20





 19:00 07:00


 


Intake Total  1110 ml


 


Balance  1110 ml


 


  


 


Intake Oral  360 ml


 


IV Total  750 ml


 


# Voids 1 3











Laboratory Tests








Test


  3/2/20


15:45 3/2/20


18:35 3/3/20


07:40 3/3/20


12:05


 


White Blood Count


  10.1 K/UL


(4.8-10.8) 


  6.4 K/UL


(4.8-10.8) 


 


 


Red Blood Count


  4.71 M/UL


(4.70-6.10) 


  4.85 M/UL


(4.70-6.10) 


 


 


Hemoglobin


  14.5 G/DL


(14.2-18.0) 


  15.1 G/DL


(14.2-18.0) 


 


 


Hematocrit


  43.7 %


(42.0-52.0) 


  43.6 %


(42.0-52.0) 


 


 


Mean Corpuscular Volume 93 FL (80-99)    90 FL (80-99)   


 


Mean Corpuscular Hemoglobin


  30.7 PG


(27.0-31.0) 


  31.2 PG


(27.0-31.0)  H 


 


 


Mean Corpuscular Hemoglobin


Concent 33.1 G/DL


(32.0-36.0) 


  34.7 G/DL


(32.0-36.0) 


 


 


Red Cell Distribution Width


  14.7 %


(11.6-14.8) 


  12.7 %


(11.6-14.8) 


 


 


Platelet Count


  239 K/UL


(150-450) 


  223 K/UL


(150-450) 


 


 


Mean Platelet Volume


  6.2 FL


(6.5-10.1)  L 


  5.7 FL


(6.5-10.1)  L 


 


 


Neutrophils (%) (Auto)


  81.4 %


(45.0-75.0)  H 


  43.8 %


(45.0-75.0)  L 


 


 


Lymphocytes (%) (Auto)


  14.1 %


(20.0-45.0)  L 


  41.7 %


(20.0-45.0) 


 


 


Monocytes (%) (Auto)


  3.7 %


(1.0-10.0) 


  12.2 %


(1.0-10.0)  H 


 


 


Eosinophils (%) (Auto)


  0.0 %


(0.0-3.0) 


  0.7 %


(0.0-3.0) 


 


 


Basophils (%) (Auto)


  0.8 %


(0.0-2.0) 


  1.7 %


(0.0-2.0) 


 


 


Sodium Level


  139 MMOL/L


(136-145) 


  138 MMOL/L


(136-145) 


 


 


Potassium Level


  3.4 MMOL/L


(3.5-5.1)  L 


  3.5 MMOL/L


(3.5-5.1) 


 


 


Chloride Level


  97 MMOL/L


()  L 


  97 MMOL/L


()  L 


 


 


Carbon Dioxide Level


  13 MMOL/L


(21-32)  L 


  22 MMOL/L


(21-32) 


 


 


Anion Gap


  29 mmol/L


(5-15)  H 


  19 mmol/L


(5-15)  H 


 


 


Blood Urea Nitrogen


  16 mg/dL


(7-18) 


  10 mg/dL


(7-18) 


 


 


Creatinine


  0.9 MG/DL


(0.55-1.30) 


  0.7 MG/DL


(0.55-1.30) 


 


 


Estimat Glomerular Filtration


Rate > 60 mL/min


(>60) 


  > 60 mL/min


(>60) 


 


 


Glucose Level


  154 MG/DL


()  H 


  141 MG/DL


()  H 


 


 


Calcium Level


  8.2 MG/DL


(8.5-10.1)  L 


  8.5 MG/DL


(8.5-10.1) 


 


 


Total Bilirubin


  1.6 MG/DL


(0.2-1.0)  H 


  1.7 MG/DL


(0.2-1.0)  H 


 


 


Direct Bilirubin


  0.4 MG/DL


(0.0-0.3)  H 


  0.3 MG/DL


(0.0-0.3) 


 


 


Aspartate Amino Transf


(AST/SGOT) 485 U/L


(15-37)  H 


  313 U/L


(15-37)  H 


 


 


Alanine Aminotransferase


(ALT/SGPT) 369 U/L


(12-78)  H 


  294 U/L


(12-78)  H 


 


 


Alkaline Phosphatase


  86 U/L


() 


  79 U/L


() 


 


 


Troponin I


  0.109 ng/mL


(0.000-0.056) 0.117 ng/mL


(0.000-0.056) 


  


 


 


Pro-B-Type Natriuretic Peptide


  904 pg/mL


(0-125)  H 


  


  


 


 


Total Protein


  7.5 G/DL


(6.4-8.2) 


  7.9 G/DL


(6.4-8.2) 


 


 


Albumin


  3.3 G/DL


(3.4-5.0)  L 


  3.5 G/DL


(3.4-5.0) 


 


 


Globulin 4.2 g/dL    4.4 g/dL   


 


Albumin/Globulin Ratio


  0.8 (1.0-2.7)


L 


  0.8 (1.0-2.7)


L 


 


 


Lipase


  328 U/L


() 


  


  


 


 


Prothrombin Time


  


  


  


  10.2 SEC


(9.30-11.50)


 


Prothromb Time International


Ratio 


  


  


  1.0 (0.9-1.1)  


 


 


Activated Partial


Thromboplast Time 


  


  


  25 SEC (23-33)


 








Height (Feet):  5


Height (Inches):  6.00


Weight (Pounds):  222


Medications





Current Medications








 Medications


  (Trade)  Dose


 Ordered  Sig/Sharon


 Route


 PRN Reason  Start Time


 Stop Time Status Last Admin


Dose Admin


 


 Acetaminophen


  (Tylenol)  650 mg  Q4H  PRN


 ORAL


 Mild Pain (Pain Scale 1-3)  3/2/20 20:02


 4/1/20 20:01   


 


 


 Barium Sulfate


  (Readi-Cat 2)  450 ml  NOW  PRN


 ORAL


 Radiology Procedure  3/3/20 10:30


 3/5/20 10:28   


 


 


 Carvedilol


  (Coreg)  6.25 mg  DAILY


 ORAL


   3/3/20 09:00


 4/2/20 08:59  3/3/20 09:15


 


 


 Dextrose


  (Dextrose 50%)  25 ml  Q30M  PRN


 IV


 Hypoglycemia  3/2/20 20:02


 4/1/20 20:01   


 


 


 Dextrose


  (Dextrose 50%)  50 ml  Q30M  PRN


 IV


 Hypoglycemia  3/2/20 20:02


 4/1/20 20:01   


 


 


 Dextrose/Sodium


 Chloride  1,000 ml @ 


 75 mls/hr  [id number]


 IV


   3/2/20 21:00


 4/1/20 20:59  3/3/20 09:25


 


 


 Diphenhydramine


 HCl


  (Benadryl)  25 mg  Q6H  PRN


 ORAL


 Itching/Pruritis  3/2/20 20:02


 4/1/20 20:01   


 


 


 Docusate Sodium


  (Colace)  100 mg  EVERY 12  HOURS


 ORAL


   3/2/20 21:00


 4/1/20 20:59  3/3/20 09:14


 


 


 Furosemide


  (Lasix)  40 mg  DAILY


 ORAL


   3/3/20 09:00


 4/2/20 08:59  3/3/20 09:15


 


 


 Heparin Sodium


  (Porcine)


  (Heparin 5000


 units/ml)  6,000 units  ONCE


 IV


   3/3/20 12:30


 3/3/20 13:30   


 


 


 Heparin Sodium/


 Dextrose  500 ml @ 


 20.14 mls/


 hr  ADJUST PER  PROTOCOL


 IV


   3/3/20 12:30


 4/2/20 12:29   


 


 


 Hydromorphone HCl


  (Dilaudid)  0.5 mg  Q4H  PRN


 IVP


 Moderate Pain (Pain Scale 4-6)  3/2/20 20:07


 3/9/20 20:06  3/2/20 21:54


 


 


 Hydromorphone HCl


  (Dilaudid)  1 mg  Q4H  PRN


 IVP


 Severe Pain (Pain Scale 7-10)  3/2/20 20:07


 3/9/20 20:06  3/3/20 01:26


 


 


 Iohexol


  (OMNIPAQUE-300


 100ml)  100 ml  NOW  PRN


 INJ


 Radiology Procedure  3/3/20 10:30


 3/5/20 10:28   


 


 


 Lorazepam


  (Ativan 2mg/ml


 1ml)  1 mg  Q4H  PRN


 IV


 For Anxiety  3/3/20 09:00


 3/10/20 08:59  3/3/20 09:17


 


 


 Losartan Potassium


  (Cozaar)  25 mg  DAILY


 ORAL


   3/3/20 09:00


 4/2/20 08:59  3/3/20 09:15


 


 


 Nitroglycerin


  (Ntg)  0.4 mg  Q5M X 3 DOSES PRN


 SL


 Prn Chest Pain  3/2/20 20:02


 4/1/20 20:01  3/3/20 09:17


 


 


 Ondansetron HCl


  (Zofran)  4 mg  Q6H  PRN


 IVP


 Nausea & Vomiting  3/2/20 20:02


 4/1/20 20:01  3/2/20 21:52


 


 


 Pantoprazole


  (Protonix)  40 mg  DAILY


 IV


   3/3/20 09:00


 4/2/20 08:59  3/3/20 09:14


 











Assessment/Plan


Problem List:  


(1) Metabolic acidosis


ICD Codes:  E87.2 - Acidosis


SNOMED:  36098808, 601886670, [id number]


(2) Elevated troponin


ICD Codes:  R79.89 - Other specified abnormal findings of blood chemistry


SNOMED:  230856201, 995281176, 175707171


(3) Chest pain


ICD Codes:  R07.9 - Chest pain, unspecified


SNOMED:  74079887


(4) Abnormal LFTs


ICD Codes:  R94.5 - Abnormal results of liver function studies


SNOMED:  382458927


Status:  stable


Diagnosis


Axis I:


Mr. Yarbrough is a 65 year old male with hx of systolic CHF, HTN, presenting with 

acute onset chest pain, found to have transaminitis. 





#Chest pain


#ACS


#HTN


#Chronic systolic CHF 


-Admit to inpatient


-Cardiology consult


-heparin gtt (3/3 -)


-Continue home lasix 40 daily. 


-Continue home coreg 6.25 BID


-Continue losartan 25 daily 


-Obtain TTE 





#Transaminitis


#Abdominal pain


-GI consult placed. appreciate recs. 


-obtain CT A/P for further visualization. 


-trend LFT's 





Extra 37 minutes spent on chart review of pertiennt info (meds, labs, imaging, 

consultant notes, etc.)





Time of note doesn't reflect time of encounter.











Jessie Webster M.D. Mar 3, 2020 13:03

## 2020-03-03 NOTE — DIAGNOSTIC IMAGING REPORT
INDICATION: Abdominal pain

 

TECHNIQUE: Continuous helical transaxial imaging of the abdomen and pelvis was

obtained from the lung bases to the pubic symphysis during intravenous contrast

administration. Coronal 2-D reformats were also obtained. Study obtained in a Siemens

sensation 64 slice CT.  Automatic Exposure Control was utilized.

 

Total Dose length Product (DLP):  1142.4 mGycm

 

CT Dose Index Volume (CTDIvol):   20.20 mGy

 

COMPARISON: None

 

FINDINGS: 

 

Lungs: The heart is prominent in size. There is a pacemaker present. Small hiatal

hernia noted. The lung bases are clear..

 

Liver: Diffuse low-attenuation of the liver demonstrated consistent with fatty

infiltration

 

Gallbladder/biliary system: No gallstones are identified. There is no evidence of

intrahepatic or extrahepatic biliary ductal dilatation.

 

Spleen: Unremarkable

 

Pancreas: Unremarkable

 

Kidneys/Bladder: There are punctate nonobstructive stones within the right kidney.

There is no hydronephrosis. There is a tiny cyst about 4 mm in size in the anterior

part of the right kidney. There is no hydronephrosis..

 

Adrenal glands: Unremarkable

 

Aorta/IVC: Unremarkable

 

Bowel: Bowel gas pattern is nonobstructive. Appendix is seen and appears normal. Few

diverticula noted in the sigmoid colon.

 

Peritoneum: There is no free fluid.

 

Bones: There is narrowing of intervertebral discs and accompanying endplate

osteophyte formation.  Hypertrophied facet joints also demonstrated.

 

IMPRESSION:

 

Small nonobstructive stones within the right kidney.

 

Fatty liver

 

Diverticulosis of the colon

 

 

 

Other incidental findings as above

 

 

 

 

 

 

 

The CT scanner at [facility] is accredited by the [employer] College of

Radiology and the scans are performed using dose optimization techniques as

appropriate to a performed exam including Automatic Exposure control.

## 2020-03-03 NOTE — CONSULTATION
History of Present Illness


General


Date patient seen:  Mar 3, 2020


Time patient seen:  11:30


Chief Complaint:  Chest Pain


Referring physician:  LAKISHA


Reason for Consultation:  ABNORMAL LFTs





Present Illness


HPI


Patient is a 67-year-old male presents after increased chest pain for the past 

3 days.  Patient has prior hx of CHF and PPM.  Cardiology consulted for elevated

/rising troponin.  CP improved with aspirin and nitro.  Initial troponin 0.117. 

Patient also had episode of NSVT.


Allergies:  


Coded Allergies:  


     No Known Allergies (Unverified , 3/2/20)





Medication History


Unable to Obtain Active Prescriptions or Reported Meds





Patient History


Healthcare decision maker


N


Resuscitation status


Full Code


Advanced Directive on File








Review of Systems


Constitutional:  Reports: no symptoms


Eye:  Reports: no symptoms


ENT:  Reports: no symptoms


Respiratory:  Reports: no symptoms


Cardiovascular:  Reports: chest pain


Gastrointestinal:  Reports: no symptoms


Genitourinary:  Reports: no symptoms


Musculoskeletal:  Reports: no symptoms


Skin:  Reports: no symptoms


Psychiatric:  Reports: no symptoms


Neurological:  Reports: no symptoms


Endocrine:  Reports: no symptoms


Hematologic/Lymphatic:  Reports: no symptoms





Physical Exam


General Appearance:  no apparent distress, alert


Lines, tubes and drains:  peripheral


HEENT:  normocephalic, atraumatic


Neck:  non-tender, normal alignment, supple, normal inspection


Respiratory/Chest:  chest wall non-tender, lungs clear, normal breath sounds, 

no respiratory distress, no accessory muscle use


Cardiovascular/Chest:  normal peripheral pulses, normal rate, regular rhythm


Abdomen:  normal bowel sounds, non tender


Extremities:  normal range of motion, non-tender, normal inspection


Skin Exam:  normal pigmentation, warm/dry, cyanotic


Neurologic:  CNs II-XII grossly normal, no motor/sensory deficits





Last 24 Hour Vital Signs








  Date Time  Temp Pulse Resp B/P (MAP) Pulse Ox O2 Delivery O2 Flow Rate FiO2


 


3/3/20 09:17    160/113    


 


3/3/20 09:15    160/113    


 


3/3/20 09:15  97  160/113    


 


3/3/20 08:00  91      


 


3/3/20 08:00 98.0 97 20 160/113 (129) 96   


 


3/3/20 04:00 97.9 94 18 134/79 (97) 97   


 


3/3/20 04:00  96      


 


3/3/20 01:56 98.0       


 


3/3/20 00:00  86      


 


3/3/20 00:00 98.0 87 18 139/84 (102) 98   


 


3/2/20 22:24 98.2       


 


3/2/20 21:30      Room Air  


 


3/2/20 21:19  90      


 


3/2/20 21:15 97.7 98 20 158/102 (120) 94   


 


3/2/20 21:10 98.2 88 16 149/93 98 Room Air  


 


3/2/20 20:40 98.2 88 16 149/93 98 Room Air  


 


3/2/20 19:30 98.2 89 16 148/89 98 Room Air  


 


3/2/20 19:26 98.2       


 


3/2/20 19:00  96  152/92    


 


3/2/20 18:54 98.2 96 16 152/92 98 Room Air  


 


3/2/20 18:19    143/91    


 


3/2/20 18:14    143/91    


 


3/2/20 18:14 98.2 87 16 143/91 98 Room Air  


 


3/2/20 16:17 98.2       


 


3/2/20 16:10  105 16   Room Air  


 


3/2/20 16:10 98.2 105 16 168/96 96 Room Air  


 


3/2/20 15:33  110  168/96    


 


3/2/20 15:07 98.2 110 16 168/96 (120) 96 Room Air  

















Intake and Output  


 


 3/2/20 3/3/20





 19:00 07:00


 


Intake Total  1110 ml


 


Balance  1110 ml


 


  


 


Intake Oral  360 ml


 


IV Total  750 ml


 


# Voids 1 3











Laboratory Tests








Test


  3/2/20


15:45 3/2/20


18:35 3/3/20


07:40


 


White Blood Count


  10.1 K/UL


(4.8-10.8) 


  6.4 K/UL


(4.8-10.8)


 


Red Blood Count


  4.71 M/UL


(4.70-6.10) 


  4.85 M/UL


(4.70-6.10)


 


Hemoglobin


  14.5 G/DL


(14.2-18.0) 


  15.1 G/DL


(14.2-18.0)


 


Hematocrit


  43.7 %


(42.0-52.0) 


  43.6 %


(42.0-52.0)


 


Mean Corpuscular Volume 93 FL (80-99)    90 FL (80-99)  


 


Mean Corpuscular Hemoglobin


  30.7 PG


(27.0-31.0) 


  31.2 PG


(27.0-31.0)  H


 


Mean Corpuscular Hemoglobin


Concent 33.1 G/DL


(32.0-36.0) 


  34.7 G/DL


(32.0-36.0)


 


Red Cell Distribution Width


  14.7 %


(11.6-14.8) 


  12.7 %


(11.6-14.8)


 


Platelet Count


  239 K/UL


(150-450) 


  223 K/UL


(150-450)


 


Mean Platelet Volume


  6.2 FL


(6.5-10.1)  L 


  5.7 FL


(6.5-10.1)  L


 


Neutrophils (%) (Auto)


  81.4 %


(45.0-75.0)  H 


  43.8 %


(45.0-75.0)  L


 


Lymphocytes (%) (Auto)


  14.1 %


(20.0-45.0)  L 


  41.7 %


(20.0-45.0)


 


Monocytes (%) (Auto)


  3.7 %


(1.0-10.0) 


  12.2 %


(1.0-10.0)  H


 


Eosinophils (%) (Auto)


  0.0 %


(0.0-3.0) 


  0.7 %


(0.0-3.0)


 


Basophils (%) (Auto)


  0.8 %


(0.0-2.0) 


  1.7 %


(0.0-2.0)


 


Sodium Level


  139 MMOL/L


(136-145) 


  138 MMOL/L


(136-145)


 


Potassium Level


  3.4 MMOL/L


(3.5-5.1)  L 


  3.5 MMOL/L


(3.5-5.1)


 


Chloride Level


  97 MMOL/L


()  L 


  97 MMOL/L


()  L


 


Carbon Dioxide Level


  13 MMOL/L


(21-32)  L 


  22 MMOL/L


(21-32)


 


Anion Gap


  29 mmol/L


(5-15)  H 


  19 mmol/L


(5-15)  H


 


Blood Urea Nitrogen


  16 mg/dL


(7-18) 


  10 mg/dL


(7-18)


 


Creatinine


  0.9 MG/DL


(0.55-1.30) 


  0.7 MG/DL


(0.55-1.30)


 


Estimat Glomerular Filtration


Rate > 60 mL/min


(>60) 


  > 60 mL/min


(>60)


 


Glucose Level


  154 MG/DL


()  H 


  141 MG/DL


()  H


 


Calcium Level


  8.2 MG/DL


(8.5-10.1)  L 


  8.5 MG/DL


(8.5-10.1)


 


Total Bilirubin


  1.6 MG/DL


(0.2-1.0)  H 


  1.7 MG/DL


(0.2-1.0)  H


 


Direct Bilirubin


  0.4 MG/DL


(0.0-0.3)  H 


  0.3 MG/DL


(0.0-0.3)


 


Aspartate Amino Transf


(AST/SGOT) 485 U/L


(15-37)  H 


  313 U/L


(15-37)  H


 


Alanine Aminotransferase


(ALT/SGPT) 369 U/L


(12-78)  H 


  294 U/L


(12-78)  H


 


Alkaline Phosphatase


  86 U/L


() 


  79 U/L


()


 


Troponin I


  0.109 ng/mL


(0.000-0.056) 0.117 ng/mL


(0.000-0.056) 


 


 


Pro-B-Type Natriuretic Peptide


  904 pg/mL


(0-125)  H 


  


 


 


Total Protein


  7.5 G/DL


(6.4-8.2) 


  7.9 G/DL


(6.4-8.2)


 


Albumin


  3.3 G/DL


(3.4-5.0)  L 


  3.5 G/DL


(3.4-5.0)


 


Globulin 4.2 g/dL    4.4 g/dL  


 


Albumin/Globulin Ratio


  0.8 (1.0-2.7)


L 


  0.8 (1.0-2.7)


L


 


Lipase


  328 U/L


() 


  


 








Height (Feet):  5


Height (Inches):  6.00


Weight (Pounds):  222


Medications





Current Medications








 Medications


  (Trade)  Dose


 Ordered  Sig/Sharon


 Route


 PRN Reason  Start Time


 Stop Time Status Last Admin


Dose Admin


 


 Acetaminophen


  (Tylenol)  650 mg  Q4H  PRN


 ORAL


 Mild Pain (Pain Scale 1-3)  3/2/20 20:02


 4/1/20 20:01   


 


 


 Barium Sulfate


  (Readi-Cat 2)  450 ml  NOW  PRN


 ORAL


 Radiology Procedure  3/3/20 10:30


 3/5/20 10:28   


 


 


 Carvedilol


  (Coreg)  6.25 mg  DAILY


 ORAL


   3/3/20 09:00


 4/2/20 08:59  3/3/20 09:15


 


 


 Dextrose


  (Dextrose 50%)  25 ml  Q30M  PRN


 IV


 Hypoglycemia  3/2/20 20:02


 4/1/20 20:01   


 


 


 Dextrose


  (Dextrose 50%)  50 ml  Q30M  PRN


 IV


 Hypoglycemia  3/2/20 20:02


 4/1/20 20:01   


 


 


 Dextrose/Sodium


 Chloride  1,000 ml @ 


 75 mls/hr  [id number]


 IV


   3/2/20 21:00


 4/1/20 20:59  3/3/20 09:25


 


 


 Diphenhydramine


 HCl


  (Benadryl)  25 mg  Q6H  PRN


 ORAL


 Itching/Pruritis  3/2/20 20:02


 4/1/20 20:01   


 


 


 Docusate Sodium


  (Colace)  100 mg  EVERY 12  HOURS


 ORAL


   3/2/20 21:00


 4/1/20 20:59  3/3/20 09:14


 


 


 Furosemide


  (Lasix)  40 mg  DAILY


 ORAL


   3/3/20 09:00


 4/2/20 08:59  3/3/20 09:15


 


 


 Heparin Sodium


  (Porcine)


  (Heparin 5000


 units/ml)  5,000 units  EVERY 12  HOURS


 SUBQ


   3/2/20 21:00


 4/1/20 20:59  3/3/20 09:16


 


 


 Hydromorphone HCl


  (Dilaudid)  0.5 mg  Q4H  PRN


 IVP


 Moderate Pain (Pain Scale 4-6)  3/2/20 20:07


 3/9/20 20:06  3/2/20 21:54


 


 


 Hydromorphone HCl


  (Dilaudid)  1 mg  Q4H  PRN


 IVP


 Severe Pain (Pain Scale 7-10)  3/2/20 20:07


 3/9/20 20:06  3/3/20 01:26


 


 


 Iohexol


  (OMNIPAQUE-300


 100ml)  100 ml  NOW  PRN


 INJ


 Radiology Procedure  3/3/20 10:30


 3/5/20 10:28   


 


 


 Lorazepam


  (Ativan 2mg/ml


 1ml)  1 mg  Q4H  PRN


 IV


 For Anxiety  3/3/20 09:00


 3/10/20 08:59  3/3/20 09:17


 


 


 Losartan Potassium


  (Cozaar)  25 mg  DAILY


 ORAL


   3/3/20 09:00


 4/2/20 08:59  3/3/20 09:15


 


 


 Nitroglycerin


  (Ntg)  0.4 mg  Q5M X 3 DOSES PRN


 SL


 Prn Chest Pain  3/2/20 20:02


 4/1/20 20:01  3/3/20 09:17


 


 


 Ondansetron HCl


  (Zofran)  4 mg  Q6H  PRN


 IVP


 Nausea & Vomiting  3/2/20 20:02


 4/1/20 20:01  3/2/20 21:52


 


 


 Pantoprazole


  (Protonix)  40 mg  DAILY


 IV


   3/3/20 09:00


 4/2/20 08:59  3/3/20 09:14


 











Assessment/Plan


Status:  stable


Assessment/Plan:


Assessment:


Chest pain


CHF acute on chronic


NSVT


Elevated troponin/ACS


Elevated LFT





Plan:


Aspirin/plavix


Heparin gtt 48 hours


Continue coreg/losartan


Statin


Nitro prn


Stress test prior to d/c


Ideally patient needs cardiac cath, if transfer to [facility] can be facilitated.











Ean Marin MD Mar 3, 2020 11:43

## 2020-03-04 VITALS — DIASTOLIC BLOOD PRESSURE: 78 MMHG | SYSTOLIC BLOOD PRESSURE: 125 MMHG

## 2020-03-04 VITALS — SYSTOLIC BLOOD PRESSURE: 150 MMHG | DIASTOLIC BLOOD PRESSURE: 87 MMHG

## 2020-03-04 VITALS — DIASTOLIC BLOOD PRESSURE: 88 MMHG | SYSTOLIC BLOOD PRESSURE: 141 MMHG

## 2020-03-04 VITALS — SYSTOLIC BLOOD PRESSURE: 153 MMHG | DIASTOLIC BLOOD PRESSURE: 90 MMHG

## 2020-03-04 VITALS — DIASTOLIC BLOOD PRESSURE: 75 MMHG | SYSTOLIC BLOOD PRESSURE: 135 MMHG

## 2020-03-04 VITALS — DIASTOLIC BLOOD PRESSURE: 76 MMHG | SYSTOLIC BLOOD PRESSURE: 130 MMHG

## 2020-03-04 LAB
ADD MANUAL DIFF: NO
ALBUMIN SERPL-MCNC: 3.1 G/DL (ref 3.4–5)
ALBUMIN/GLOB SERPL: 0.8 {RATIO} (ref 1–2.7)
ALP SERPL-CCNC: 69 U/L (ref 46–116)
ALT SERPL-CCNC: 190 U/L (ref 12–78)
ANION GAP SERPL CALC-SCNC: 10 MMOL/L (ref 5–15)
APTT BLD: 89 SEC (ref 23–33)
AST SERPL-CCNC: 185 U/L (ref 15–37)
BASOPHILS NFR BLD AUTO: 1.3 % (ref 0–2)
BILIRUB DIRECT SERPL-MCNC: 0.3 MG/DL (ref 0–0.3)
BILIRUB SERPL-MCNC: 1.5 MG/DL (ref 0.2–1)
BUN SERPL-MCNC: 11 MG/DL (ref 7–18)
CALCIUM SERPL-MCNC: 8.4 MG/DL (ref 8.5–10.1)
CHLORIDE SERPL-SCNC: 97 MMOL/L (ref 98–107)
CO2 SERPL-SCNC: 29 MMOL/L (ref 21–32)
CREAT SERPL-MCNC: 0.7 MG/DL (ref 0.55–1.3)
EOSINOPHIL NFR BLD AUTO: 3.4 % (ref 0–3)
ERYTHROCYTE [DISTWIDTH] IN BLOOD BY AUTOMATED COUNT: 12.5 % (ref 11.6–14.8)
GLOBULIN SER-MCNC: 4 G/DL
HCT VFR BLD CALC: 41 % (ref 42–52)
HGB BLD-MCNC: 14 G/DL (ref 14.2–18)
INR PPP: 1 (ref 0.9–1.1)
LYMPHOCYTES NFR BLD AUTO: 30.5 % (ref 20–45)
MCV RBC AUTO: 90 FL (ref 80–99)
MONOCYTES NFR BLD AUTO: 7.7 % (ref 1–10)
NEUTROPHILS NFR BLD AUTO: 57.1 % (ref 45–75)
PLATELET # BLD: 157 K/UL (ref 150–450)
POTASSIUM SERPL-SCNC: 3.1 MMOL/L (ref 3.5–5.1)
RBC # BLD AUTO: 4.54 M/UL (ref 4.7–6.1)
SODIUM SERPL-SCNC: 136 MMOL/L (ref 136–145)
WBC # BLD AUTO: 7.3 K/UL (ref 4.8–10.8)

## 2020-03-04 RX ADMIN — PANTOPRAZOLE SODIUM SCH MG: 40 INJECTION, POWDER, FOR SOLUTION INTRAVENOUS at 08:58

## 2020-03-04 RX ADMIN — DIPHENHYDRAMINE HYDROCHLORIDE PRN MG: 50 INJECTION INTRAMUSCULAR; INTRAVENOUS at 18:32

## 2020-03-04 RX ADMIN — DIPHENHYDRAMINE HYDROCHLORIDE PRN MG: 50 INJECTION INTRAMUSCULAR; INTRAVENOUS at 10:52

## 2020-03-04 RX ADMIN — DOCUSATE SODIUM SCH MG: 100 CAPSULE, LIQUID FILLED ORAL at 20:51

## 2020-03-04 RX ADMIN — CARVEDILOL SCH MG: 6.25 TABLET, FILM COATED ORAL at 08:58

## 2020-03-04 RX ADMIN — FUROSEMIDE SCH MG: 40 TABLET ORAL at 08:57

## 2020-03-04 RX ADMIN — HEPARIN SODIUM SCH MLS/HR: 5000 INJECTION, SOLUTION INTRAVENOUS at 09:25

## 2020-03-04 RX ADMIN — DOCUSATE SODIUM SCH MG: 100 CAPSULE, LIQUID FILLED ORAL at 08:58

## 2020-03-04 RX ADMIN — LOSARTAN POTASSIUM SCH MG: 25 TABLET, FILM COATED ORAL at 08:57

## 2020-03-04 NOTE — CARDIOLOGY PROGRESS NOTE
Assessment/Plan


Status:  stable


Assessment/Plan


Assessment/Plan


Status:  stable


Assessment/Plan:


Assessment:


Chest pain


CHF acute on chronic


NSVT


Elevated troponin/ACS


Elevated LFT





Plan:


Aspirin/plavix


Heparin gtt 48 hours


Continue coreg/losartan


Statin


Nitro prn


Stress test prior Thursday after 48 hours heparin


Ideally patient needs cardiac cath, if transfer to [facility] can be facilitated.





Subjective


Cardiovascular:  Reports: no symptoms


Respiratory:  Reports: no symptoms


Gastrointestinal/Abdominal:  Reports: no symptoms


Genitourinary:  Reports: no symptoms


Subjective


No acute events, troponin downtrending, plan for stress test, currently no 

chest pain





Objective





Last 24 Hour Vital Signs








  Date Time  Temp Pulse Resp B/P (MAP) Pulse Ox O2 Delivery O2 Flow Rate FiO2


 


3/4/20 08:00 98.1 101 18 150/87 (108) 94   


 


3/4/20 04:00 98.2 90 18 135/75 (95) 98   


 


3/4/20 04:00  90      


 


3/4/20 00:00  101      


 


3/4/20 00:00 97.7 101 20 153/90 (111) 95   


 


3/3/20 21:35 97.9       


 


3/3/20 21:00      Room Air  


 


3/3/20 20:00  98      


 


3/3/20 20:00 97.7 97 19 133/77 (95) 95   


 


3/3/20 16:00 97.9 99 20 134/81 (98) 96   


 


3/3/20 16:00  95      


 


3/3/20 12:00 97.7 83 20 146/98 (114) 95   


 


3/3/20 12:00  83      


 


3/3/20 09:17    160/113    


 


3/3/20 09:15    160/113    


 


3/3/20 09:15  97  160/113    


 


3/3/20 09:00      Room Air  








General Appearance:  no apparent distress, alert


EENT:  PERRL/EOMI, normal ENT inspection, TMs normal, pharynx normal


Neck:  non-tender, normal alignment, supple, normal inspection, no JVD


Rhythm:  NSR


Cardiovascular:  normal peripheral pulses, normal rate, regular rhythm


Respiratory/Chest:  chest wall non-tender, lungs clear, normal breath sounds, 

no respiratory distress, no accessory muscle use


Abdomen:  normal bowel sounds, non tender, soft, no organomegaly, no mass


Extremities:  normal range of motion, non-tender, normal inspection, no calf 

tenderness, no swelling


Neurologic:  CNs II-XII grossly normal, no motor/sensory deficits











Intake and Output  


 


 3/3/20 3/4/20





 19:00 07:00


 


Intake Total 247 ml 266 ml


 


Balance 247 ml 266 ml


 


  


 


Intake Oral 237 ml 


 


IV Total 10 ml 166 ml


 


Other  100 ml


 


# Voids 3 3











Laboratory Tests








Test


  3/3/20


12:05 3/3/20


22:15 3/4/20


05:38


 


Prothrombin Time


  10.2 SEC


(9.30-11.50) 


  10.6 SEC


(9.30-11.50)


 


Prothromb Time International


Ratio 1.0 (0.9-1.1)  


  


  1.0 (0.9-1.1)  


 


 


Activated Partial


Thromboplast Time 25 SEC (23-33)


  48 SEC (23-33)


H 89 SEC (23-33)


H


 


Troponin I


  


  0.076 ng/mL


(0.000-0.056) 


 


 


White Blood Count


  


  


  7.3 K/UL


(4.8-10.8)


 


Red Blood Count


  


  


  4.54 M/UL


(4.70-6.10)  L


 


Hemoglobin


  


  


  14.0 G/DL


(14.2-18.0)  L


 


Hematocrit


  


  


  41.0 %


(42.0-52.0)  L


 


Mean Corpuscular Volume   90 FL (80-99)  


 


Mean Corpuscular Hemoglobin


  


  


  30.7 PG


(27.0-31.0)


 


Mean Corpuscular Hemoglobin


Concent 


  


  34.1 G/DL


(32.0-36.0)


 


Red Cell Distribution Width


  


  


  12.5 %


(11.6-14.8)


 


Platelet Count


  


  


  157 K/UL


(150-450)


 


Mean Platelet Volume


  


  


  6.2 FL


(6.5-10.1)  L


 


Neutrophils (%) (Auto)


  


  


  57.1 %


(45.0-75.0)


 


Lymphocytes (%) (Auto)


  


  


  30.5 %


(20.0-45.0)


 


Monocytes (%) (Auto)


  


  


  7.7 %


(1.0-10.0)


 


Eosinophils (%) (Auto)


  


  


  3.4 %


(0.0-3.0)  H


 


Basophils (%) (Auto)


  


  


  1.3 %


(0.0-2.0)


 


Sodium Level


  


  


  136 MMOL/L


(136-145)


 


Potassium Level


  


  


  3.1 MMOL/L


(3.5-5.1)  L


 


Chloride Level


  


  


  97 MMOL/L


()  L


 


Carbon Dioxide Level


  


  


  29 MMOL/L


(21-32)


 


Anion Gap


  


  


  10 mmol/L


(5-15)


 


Blood Urea Nitrogen


  


  


  11 mg/dL


(7-18)


 


Creatinine


  


  


  0.7 MG/DL


(0.55-1.30)


 


Estimat Glomerular Filtration


Rate 


  


  > 60 mL/min


(>60)


 


Glucose Level


  


  


  158 MG/DL


()  H


 


Calcium Level


  


  


  8.4 MG/DL


(8.5-10.1)  L


 


Total Bilirubin


  


  


  1.5 MG/DL


(0.2-1.0)  H


 


Direct Bilirubin


  


  


  0.3 MG/DL


(0.0-0.3)


 


Aspartate Amino Transf


(AST/SGOT) 


  


  185 U/L


(15-37)  H


 


Alanine Aminotransferase


(ALT/SGPT) 


  


  190 U/L


(12-78)  H


 


Alkaline Phosphatase


  


  


  69 U/L


()


 


Total Protein


  


  


  7.1 G/DL


(6.4-8.2)


 


Albumin


  


  


  3.1 G/DL


(3.4-5.0)  L


 


Globulin   4.0 g/dL  


 


Albumin/Globulin Ratio


  


  


  0.8 (1.0-2.7)


L


 


Hepatitis A IgM Antibody   Pending  


 


Hepatitis B Surface Antigen   Pending  


 


Hepatitis B Core IgM Antibody   Pending  


 


Hepatitis C Antibody   Pending  

















Ean Marin MD Mar 4, 2020 08:26

## 2020-03-04 NOTE — GENERAL PROGRESS NOTE
Assessment/Plan


Problem List:  


(1) Metabolic acidosis


ICD Codes:  E87.2 - Acidosis


SNOMED:  43100520, 939860351, 998498679


(2) Elevated troponin


ICD Codes:  R79.89 - Other specified abnormal findings of blood chemistry


SNOMED:  263197361, 195582874, 513066453


(3) Chest pain


ICD Codes:  R07.9 - Chest pain, unspecified


SNOMED:  47800185


(4) Abnormal LFTs


ICD Codes:  R94.5 - Abnormal results of liver function studies


SNOMED:  209141055


Status:  stable, unchanged


Assessment/Plan:


Mr. Yarbrough is a 65 year old male with hx of systolic CHF, HTN, presenting with 

acute onset chest pain, found to have transaminitis. 





#Chest pain


#ACS


#HTN


#Chronic systolic CHF, EF 30% 


-Cardiology consult


-heparin gtt (3/3 -)


-Plan for stress test. 


-Continue home lasix 40 daily. 


-Continue home coreg 6.25 BID


-Continue losartan 25 daily 


-TTE with EF 30%. severe LV dysfunction. 





#Transaminitis


#Abdominal pain


-GI consult placed. appreciate recs. 


-CT A/P unremarkable.





Time spent on encounter: 36 mins, >50% on counseling and coordination of care.





Time of note doesn't reflect time of encounter.





Subjective


Date patient seen:  Mar 4, 2020


Time patient seen:  13:30


ROS Limited/Unobtainable:  No


Constitutional:  Denies: no symptoms, chills, diaphoresis, fever, malaise, 

weakness, other


HEENT:  Denies: no symptoms, eye pain, blurred vision, tearing, double vision, 

ear pain, ear discharge, nose pain, nose congestion, throat pain, throat 

swelling, mouth pain, mouth swelling, other


Cardiovascular:  Reports: no symptoms, chest pain, edema, irregular heart rate, 

lightheadedness, palpitations, syncope, other


Respiratory:  Denies: no symptoms, cough, orthopnea, shortness of breath, SOB 

with excertion, SOB at rest, sputum, stridor, wheezing, other


Gastrointestinal/Abdominal:  Denies: no symptoms, abdomen distended, abdominal 

pain, black stools, tarry stools, blood in stool, constipated, diarrhea, 

difficulty swallowing, nausea, poor appetite, poor fluid intake, rectal bleeding

, vomiting, other


Genitourinary:  Denies: no symptoms, burning, discharge, frequency, flank pain, 

hematuria, incontinence, pain, urgency, other


Neurologic/Psychiatric:  Denies: no symptoms, anxiety, depressed, emotional 

problems, headache, numbness, paresthesia, pre-existing deficit, seizure, 

tingling, tremors, weakness, other


Endocrine:  Denies: no symptoms, excessive sweating, flushing, intolerance to 

cold, intolerance to heat, increased hunger, increased thirst, increased urine, 

unexplained weight gain, unexplained weight loss, other


Hematologic/Lymphatic:  Denies: no symptoms, anemia, easy bleeding, easy 

bruising, other


Allergies:  


Coded Allergies:  


     No Known Allergies (Unverified , 3/2/20)


Subjective


resting in bed, wincing. still complaining of chests pain





Objective





Last 24 Hour Vital Signs








  Date Time  Temp Pulse Resp B/P (MAP) Pulse Ox O2 Delivery O2 Flow Rate FiO2


 


3/4/20 12:00  87      


 


3/4/20 12:00 98.1 90 18 141/88 (105) 94   


 


3/4/20 09:00      Room Air  


 


3/4/20 08:58  101  150/87    


 


3/4/20 08:57    150/87    


 


3/4/20 08:00 98.1 101 18 150/87 (108) 94   


 


3/4/20 08:00  91      


 


3/4/20 04:00 98.2 90 18 135/75 (95) 98   


 


3/4/20 04:00  90      


 


3/4/20 00:00  101      


 


3/4/20 00:00 97.7 101 20 153/90 (111) 95   


 


3/3/20 21:35 97.9       


 


3/3/20 21:00      Room Air  


 


3/3/20 20:00  98      


 


3/3/20 20:00 97.7 97 19 133/77 (95) 95   

















Intake and Output  


 


 3/3/20 3/4/20





 18:59 06:59


 


Intake Total 312 ml 248 ml


 


Balance 312 ml 248 ml


 


  


 


Intake Oral 237 ml 


 


IV Total 75 ml 148 ml


 


Other  100 ml


 


# Voids 3 3








Laboratory Tests


3/3/20 22:15: 


Activated Partial Thromboplast Time 48H, Troponin I 0.076H


3/4/20 05:38: 


Activated Partial Thromboplast Time 89H, White Blood Count 7.3, Red Blood Count 

4.54L, Hemoglobin 14.0L, Hematocrit 41.0L, Mean Corpuscular Volume 90, Mean 

Corpuscular Hemoglobin 30.7, Mean Corpuscular Hemoglobin Concent 34.1, Red Cell 

Distribution Width 12.5, Platelet Count 157, Mean Platelet Volume 6.2L, 

Neutrophils (%) (Auto) 57.1, Lymphocytes (%) (Auto) 30.5, Monocytes (%) (Auto) 

7.7, Eosinophils (%) (Auto) 3.4H, Basophils (%) (Auto) 1.3, Prothrombin Time 

10.6, Prothromb Time International Ratio 1.0, Sodium Level 136, Potassium Level 

3.1L, Chloride Level 97L, Carbon Dioxide Level 29, Anion Gap 10, Blood Urea 

Nitrogen 11, Creatinine 0.7, Estimat Glomerular Filtration Rate > 60, Glucose 

Level 158H, Calcium Level 8.4L, Total Bilirubin 1.5H, Direct Bilirubin 0.3, 

Aspartate Amino Transf (AST/SGOT) 185H, Alanine Aminotransferase (ALT/SGPT) 190H

, Alkaline Phosphatase 69, Total Protein 7.1, Albumin 3.1L, Globulin 4.0, 

Albumin/Globulin Ratio 0.8L, Hepatitis A IgM Antibody [Pending], Hepatitis B 

Surface Antigen [Pending], Hepatitis B Core IgM Antibody [Pending], Hepatitis C 

Antibody [Pending]


Height (Feet):  5


Height (Inches):  6.00


Weight (Pounds):  235


General Appearance:  no apparent distress, alert


Neck:  supple


Cardiovascular:  normal rate, regular rhythm


Respiratory/Chest:  lungs clear, normal breath sounds


Abdomen:  non tender, soft


Neurologic:  alert, oriented x 3











Jessie Webster M.D. Mar 4, 2020 16:31

## 2020-03-04 NOTE — GI PROGRESS NOTE
Assessment/Plan


Problems:  


(1) Elevated troponin


ICD Codes:  R79.89 - Other specified abnormal findings of blood chemistry


SNOMED:  018294005, 965733615, 372322421


(2) Chest pain


ICD Codes:  R07.9 - Chest pain, unspecified


SNOMED:  34774578


(3) Abnormal LFTs


ICD Codes:  R94.5 - Abnormal results of liver function studies


SNOMED:  342017416


Status:  stable, unchanged


Status Narrative


Discussed with Dr. Elizabeth.


Assessment/Plan


Abdominal ultrasound review >> unable to visualize given patient's body habitus 

and overlying gas


APCT reviewed >> fatty liver, nonobstructive kidney stones


Transaminitis could also be elevated due to hepatic congestion given the 

patient has prior history of CHF





Follow up hepatitis panel to rule out any chronic viral infection


No plans for any GI procedures at this time given elevated troponin levels


Follow-up cardiology recommendations


We will consider GI procedures if needed, otherwise outpatient


Repeat liver function test for tomorrow


Advance diet as tolerated


PPI


We will follow on a daily basis with any additional recommendations





The patient was seen and examined at bedside and all new and available data was 

reviewed in the patients chart. I agree with the above findings, impression 

and plan.  (Patient seen earlier today. Signature stamp does not reflect 

patient encounter time.). - Elier Elizabeth MD





Subjective


Gastrointestinal/Abdominal:  Reports: no symptoms





Objective





Last 24 Hour Vital Signs








  Date Time  Temp Pulse Resp B/P (MAP) Pulse Ox O2 Delivery O2 Flow Rate FiO2


 


3/4/20 08:58  101  150/87    


 


3/4/20 08:57    150/87    


 


3/4/20 08:00 98.1 101 18 150/87 (108) 94   


 


3/4/20 04:00 98.2 90 18 135/75 (95) 98   


 


3/4/20 04:00  90      


 


3/4/20 00:00  101      


 


3/4/20 00:00 97.7 101 20 153/90 (111) 95   


 


3/3/20 21:35 97.9       


 


3/3/20 21:00      Room Air  


 


3/3/20 20:00  98      


 


3/3/20 20:00 97.7 97 19 133/77 (95) 95   


 


3/3/20 16:00 97.9 99 20 134/81 (98) 96   


 


3/3/20 16:00  95      


 


3/3/20 12:00 97.7 83 20 146/98 (114) 95   


 


3/3/20 12:00  83      

















Intake and Output  


 


 3/3/20 3/4/20





 19:00 07:00


 


Intake Total 247 ml 266 ml


 


Balance 247 ml 266 ml


 


  


 


Intake Oral 237 ml 


 


IV Total 10 ml 166 ml


 


Other  100 ml


 


# Voids 3 3











Laboratory Tests








Test


  3/3/20


12:05 3/3/20


22:15 3/4/20


05:38


 


Prothrombin Time


  10.2 SEC


(9.30-11.50) 


  10.6 SEC


(9.30-11.50)


 


Prothromb Time International


Ratio 1.0 (0.9-1.1)  


  


  1.0 (0.9-1.1)  


 


 


Activated Partial


Thromboplast Time 25 SEC (23-33)


  48 SEC (23-33)


H 89 SEC (23-33)


H


 


Troponin I


  


  0.076 ng/mL


(0.000-0.056) 


 


 


White Blood Count


  


  


  7.3 K/UL


(4.8-10.8)


 


Red Blood Count


  


  


  4.54 M/UL


(4.70-6.10)  L


 


Hemoglobin


  


  


  14.0 G/DL


(14.2-18.0)  L


 


Hematocrit


  


  


  41.0 %


(42.0-52.0)  L


 


Mean Corpuscular Volume   90 FL (80-99)  


 


Mean Corpuscular Hemoglobin


  


  


  30.7 PG


(27.0-31.0)


 


Mean Corpuscular Hemoglobin


Concent 


  


  34.1 G/DL


(32.0-36.0)


 


Red Cell Distribution Width


  


  


  12.5 %


(11.6-14.8)


 


Platelet Count


  


  


  157 K/UL


(150-450)


 


Mean Platelet Volume


  


  


  6.2 FL


(6.5-10.1)  L


 


Neutrophils (%) (Auto)


  


  


  57.1 %


(45.0-75.0)


 


Lymphocytes (%) (Auto)


  


  


  30.5 %


(20.0-45.0)


 


Monocytes (%) (Auto)


  


  


  7.7 %


(1.0-10.0)


 


Eosinophils (%) (Auto)


  


  


  3.4 %


(0.0-3.0)  H


 


Basophils (%) (Auto)


  


  


  1.3 %


(0.0-2.0)


 


Sodium Level


  


  


  136 MMOL/L


(136-145)


 


Potassium Level


  


  


  3.1 MMOL/L


(3.5-5.1)  L


 


Chloride Level


  


  


  97 MMOL/L


()  L


 


Carbon Dioxide Level


  


  


  29 MMOL/L


(21-32)


 


Anion Gap


  


  


  10 mmol/L


(5-15)


 


Blood Urea Nitrogen


  


  


  11 mg/dL


(7-18)


 


Creatinine


  


  


  0.7 MG/DL


(0.55-1.30)


 


Estimat Glomerular Filtration


Rate 


  


  > 60 mL/min


(>60)


 


Glucose Level


  


  


  158 MG/DL


()  H


 


Calcium Level


  


  


  8.4 MG/DL


(8.5-10.1)  L


 


Total Bilirubin


  


  


  1.5 MG/DL


(0.2-1.0)  H


 


Direct Bilirubin


  


  


  0.3 MG/DL


(0.0-0.3)


 


Aspartate Amino Transf


(AST/SGOT) 


  


  185 U/L


(15-37)  H


 


Alanine Aminotransferase


(ALT/SGPT) 


  


  190 U/L


(12-78)  H


 


Alkaline Phosphatase


  


  


  69 U/L


()


 


Total Protein


  


  


  7.1 G/DL


(6.4-8.2)


 


Albumin


  


  


  3.1 G/DL


(3.4-5.0)  L


 


Globulin   4.0 g/dL  


 


Albumin/Globulin Ratio


  


  


  0.8 (1.0-2.7)


L


 


Hepatitis A IgM Antibody   Pending  


 


Hepatitis B Surface Antigen   Pending  


 


Hepatitis B Core IgM Antibody   Pending  


 


Hepatitis C Antibody   Pending  








Height (Feet):  5


Height (Inches):  6.00


Weight (Pounds):  235


General Appearance:  WD/WN, no apparent distress, alert


Cardiovascular:  normal rate


Respiratory/Chest:  normal breath sounds, no respiratory distress


Abdominal Exam:  normal bowel sounds, non tender, soft


Extremities:  normal range of motion, non-tender











Daniel Patel NP Mar 4, 2020 10:35

## 2020-03-05 VITALS — DIASTOLIC BLOOD PRESSURE: 72 MMHG | SYSTOLIC BLOOD PRESSURE: 107 MMHG

## 2020-03-05 VITALS — SYSTOLIC BLOOD PRESSURE: 124 MMHG | DIASTOLIC BLOOD PRESSURE: 68 MMHG

## 2020-03-05 VITALS — DIASTOLIC BLOOD PRESSURE: 63 MMHG | SYSTOLIC BLOOD PRESSURE: 87 MMHG

## 2020-03-05 VITALS — SYSTOLIC BLOOD PRESSURE: 105 MMHG | DIASTOLIC BLOOD PRESSURE: 62 MMHG

## 2020-03-05 VITALS — SYSTOLIC BLOOD PRESSURE: 101 MMHG | DIASTOLIC BLOOD PRESSURE: 70 MMHG

## 2020-03-05 VITALS — DIASTOLIC BLOOD PRESSURE: 48 MMHG | SYSTOLIC BLOOD PRESSURE: 100 MMHG

## 2020-03-05 VITALS — DIASTOLIC BLOOD PRESSURE: 70 MMHG | SYSTOLIC BLOOD PRESSURE: 103 MMHG

## 2020-03-05 VITALS — DIASTOLIC BLOOD PRESSURE: 70 MMHG | SYSTOLIC BLOOD PRESSURE: 130 MMHG

## 2020-03-05 VITALS — SYSTOLIC BLOOD PRESSURE: 134 MMHG | DIASTOLIC BLOOD PRESSURE: 58 MMHG

## 2020-03-05 VITALS — SYSTOLIC BLOOD PRESSURE: 103 MMHG | DIASTOLIC BLOOD PRESSURE: 66 MMHG

## 2020-03-05 VITALS — SYSTOLIC BLOOD PRESSURE: 101 MMHG | DIASTOLIC BLOOD PRESSURE: 64 MMHG

## 2020-03-05 VITALS — DIASTOLIC BLOOD PRESSURE: 83 MMHG | SYSTOLIC BLOOD PRESSURE: 127 MMHG

## 2020-03-05 VITALS — SYSTOLIC BLOOD PRESSURE: 120 MMHG | DIASTOLIC BLOOD PRESSURE: 79 MMHG

## 2020-03-05 VITALS — SYSTOLIC BLOOD PRESSURE: 118 MMHG | DIASTOLIC BLOOD PRESSURE: 74 MMHG

## 2020-03-05 LAB
ADD MANUAL DIFF: NO
ADD MANUAL DIFF: NO
ALBUMIN SERPL-MCNC: 3 G/DL (ref 3.4–5)
ALBUMIN/GLOB SERPL: 0.8 {RATIO} (ref 1–2.7)
ALP SERPL-CCNC: 63 U/L (ref 46–116)
ALT SERPL-CCNC: 157 U/L (ref 12–78)
ANION GAP SERPL CALC-SCNC: 9 MMOL/L (ref 5–15)
APTT BLD: 51 SEC (ref 23–33)
AST SERPL-CCNC: 127 U/L (ref 15–37)
BASOPHILS NFR BLD AUTO: 0.7 % (ref 0–2)
BASOPHILS NFR BLD AUTO: 0.8 % (ref 0–2)
BILIRUB DIRECT SERPL-MCNC: 0.3 MG/DL (ref 0–0.3)
BILIRUB SERPL-MCNC: 1.3 MG/DL (ref 0.2–1)
BUN SERPL-MCNC: 23 MG/DL (ref 7–18)
CALCIUM SERPL-MCNC: 8.6 MG/DL (ref 8.5–10.1)
CHLORIDE SERPL-SCNC: 98 MMOL/L (ref 98–107)
CO2 SERPL-SCNC: 31 MMOL/L (ref 21–32)
CREAT SERPL-MCNC: 0.7 MG/DL (ref 0.55–1.3)
EOSINOPHIL NFR BLD AUTO: 1.7 % (ref 0–3)
EOSINOPHIL NFR BLD AUTO: 2 % (ref 0–3)
ERYTHROCYTE [DISTWIDTH] IN BLOOD BY AUTOMATED COUNT: 12.5 % (ref 11.6–14.8)
ERYTHROCYTE [DISTWIDTH] IN BLOOD BY AUTOMATED COUNT: 12.5 % (ref 11.6–14.8)
GLOBULIN SER-MCNC: 4 G/DL
HCT VFR BLD CALC: 38.4 % (ref 42–52)
HCT VFR BLD CALC: 39.1 % (ref 42–52)
HGB BLD-MCNC: 13.1 G/DL (ref 14.2–18)
HGB BLD-MCNC: 13.4 G/DL (ref 14.2–18)
INR PPP: 1 (ref 0.9–1.1)
LYMPHOCYTES NFR BLD AUTO: 18.8 % (ref 20–45)
LYMPHOCYTES NFR BLD AUTO: 23 % (ref 20–45)
MCV RBC AUTO: 91 FL (ref 80–99)
MCV RBC AUTO: 92 FL (ref 80–99)
MONOCYTES NFR BLD AUTO: 4.5 % (ref 1–10)
MONOCYTES NFR BLD AUTO: 5.8 % (ref 1–10)
NEUTROPHILS NFR BLD AUTO: 68.8 % (ref 45–75)
NEUTROPHILS NFR BLD AUTO: 74 % (ref 45–75)
PLATELET # BLD: 161 K/UL (ref 150–450)
PLATELET # BLD: 181 K/UL (ref 150–450)
POTASSIUM SERPL-SCNC: 3.3 MMOL/L (ref 3.5–5.1)
RBC # BLD AUTO: 4.17 M/UL (ref 4.7–6.1)
RBC # BLD AUTO: 4.3 M/UL (ref 4.7–6.1)
SODIUM SERPL-SCNC: 138 MMOL/L (ref 136–145)
WBC # BLD AUTO: 12.9 K/UL (ref 4.8–10.8)
WBC # BLD AUTO: 15 K/UL (ref 4.8–10.8)

## 2020-03-05 PROCEDURE — 0W3P8ZZ CONTROL BLEEDING IN GASTROINTESTINAL TRACT, VIA NATURAL OR ARTIFICIAL OPENING ENDOSCOPIC: ICD-10-PCS

## 2020-03-05 RX ADMIN — CARVEDILOL SCH MG: 6.25 TABLET, FILM COATED ORAL at 09:00

## 2020-03-05 RX ADMIN — PANTOPRAZOLE SODIUM SCH MG: 40 INJECTION, POWDER, FOR SOLUTION INTRAVENOUS at 08:56

## 2020-03-05 RX ADMIN — SODIUM CHLORIDE SCH MG: 0.9 INJECTION INTRAVENOUS at 21:04

## 2020-03-05 RX ADMIN — LOSARTAN POTASSIUM SCH MG: 25 TABLET, FILM COATED ORAL at 09:00

## 2020-03-05 RX ADMIN — PANTOPRAZOLE SODIUM SCH MLS/HR: 40 INJECTION, POWDER, FOR SOLUTION INTRAVENOUS at 22:09

## 2020-03-05 RX ADMIN — SODIUM CHLORIDE SCH MG: 0.9 INJECTION INTRAVENOUS at 15:15

## 2020-03-05 RX ADMIN — DOCUSATE SODIUM SCH MG: 100 CAPSULE, LIQUID FILLED ORAL at 21:00

## 2020-03-05 RX ADMIN — FUROSEMIDE SCH MG: 40 TABLET ORAL at 08:55

## 2020-03-05 RX ADMIN — HEPARIN SODIUM SCH MLS/HR: 5000 INJECTION, SOLUTION INTRAVENOUS at 04:19

## 2020-03-05 RX ADMIN — DOCUSATE SODIUM SCH MG: 100 CAPSULE, LIQUID FILLED ORAL at 09:04

## 2020-03-05 RX ADMIN — FUROSEMIDE SCH MG: 40 TABLET ORAL at 09:00

## 2020-03-05 NOTE — CARDIOLOGY PROGRESS NOTE
Assessment/Plan


Status:  stable


Assessment/Plan


Assessment/Plan


Status:  stable


Assessment/Plan:


Assessment:


Chest pain


CHF acute on chronic


NSVT


Elevated troponin/ACS


Elevated LFT





Plan:


Aspirin/plavix


Heparin gtt 48 hours


Continue coreg/losartan


Statin


Nitro prn


Stress test prior Thursday after 48 hours heparin


Ideally patient needs cardiac cath, if transfer to [facility] can be facilitated.





Subjective


Cardiovascular:  Reports: no symptoms


Respiratory:  Reports: no symptoms


Gastrointestinal/Abdominal:  Reports: no symptoms


Genitourinary:  Reports: no symptoms


Subjective


No acute events, troponin downtrending, plan for stress test, currently no 

chest pain





Objective





Last 24 Hour Vital Signs








  Date Time  Temp Pulse Resp B/P (MAP) Pulse Ox O2 Delivery O2 Flow Rate FiO2


 


3/5/20 04:00 95.9 104 18 127/83 (98) 96   


 


3/5/20 04:00  106      


 


3/5/20 00:00 97.9 90 18 130/70 (90) 95   


 


3/5/20 00:00  96      


 


3/4/20 21:00      Room Air  


 


3/4/20 20:00  82      


 


3/4/20 20:00 96.6 93 18 125/78 (94) 96   


 


3/4/20 16:00  90      


 


3/4/20 16:00 97.5 87 18 130/76 (94) 95   


 


3/4/20 12:00  87      


 


3/4/20 12:00 98.1 90 18 141/88 (105) 94   


 


3/4/20 09:00      Room Air  


 


3/4/20 08:58  101  150/87    


 


3/4/20 08:57    150/87    








General Appearance:  no apparent distress, alert


EENT:  PERRL/EOMI, normal ENT inspection, TMs normal, pharynx normal


Neck:  non-tender, normal alignment, supple, normal inspection, no JVD


Rhythm:  NSR


Cardiovascular:  normal peripheral pulses, normal rate, regular rhythm, no 

gallop/murmur


Respiratory/Chest:  chest wall non-tender, lungs clear, normal breath sounds


Abdomen:  normal bowel sounds, non tender, soft, no organomegaly, no mass


Extremities:  non-tender, normal inspection


Neurologic:  CNs II-XII grossly normal, no motor/sensory deficits











Intake and Output  


 


 3/4/20 3/5/20





 19:00 07:00


 


Intake Total 518.195 ml 320.281 ml


 


Balance 518.195 ml 320.281 ml


 


  


 


Intake Oral 490 ml 


 


IV Total 28.195 ml 320.281 ml


 


# Voids 1 3











Laboratory Tests








Test


  3/5/20


03:56


 


White Blood Count


  12.9 K/UL


(4.8-10.8)  #H


 


Red Blood Count


  4.30 M/UL


(4.70-6.10)  L


 


Hemoglobin


  13.4 G/DL


(14.2-18.0)  L


 


Hematocrit


  39.1 %


(42.0-52.0)  L


 


Mean Corpuscular Volume 91 FL (80-99)  


 


Mean Corpuscular Hemoglobin


  31.1 PG


(27.0-31.0)  H


 


Mean Corpuscular Hemoglobin


Concent 34.2 G/DL


(32.0-36.0)


 


Red Cell Distribution Width


  12.5 %


(11.6-14.8)


 


Platelet Count


  161 K/UL


(150-450)


 


Mean Platelet Volume


  6.4 FL


(6.5-10.1)  L


 


Neutrophils (%) (Auto)


  74.0 %


(45.0-75.0)


 


Lymphocytes (%) (Auto)


  18.8 %


(20.0-45.0)  L


 


Monocytes (%) (Auto)


  4.5 %


(1.0-10.0)


 


Eosinophils (%) (Auto)


  2.0 %


(0.0-3.0)


 


Basophils (%) (Auto)


  0.7 %


(0.0-2.0)


 


Prothrombin Time


  10.5 SEC


(9.30-11.50)


 


Prothromb Time International


Ratio 1.0 (0.9-1.1)  


 


 


Activated Partial


Thromboplast Time 51 SEC (23-33)


H


 


Sodium Level


  138 MMOL/L


(136-145)


 


Potassium Level


  3.3 MMOL/L


(3.5-5.1)  L


 


Chloride Level


  98 MMOL/L


()


 


Carbon Dioxide Level


  31 MMOL/L


(21-32)


 


Anion Gap


  9 mmol/L


(5-15)


 


Blood Urea Nitrogen


  23 mg/dL


(7-18)  H


 


Creatinine


  0.7 MG/DL


(0.55-1.30)


 


Estimat Glomerular Filtration


Rate > 60 mL/min


(>60)


 


Glucose Level


  147 MG/DL


()  H


 


Calcium Level


  8.6 MG/DL


(8.5-10.1)


 


Total Bilirubin


  1.3 MG/DL


(0.2-1.0)  H


 


Direct Bilirubin


  0.3 MG/DL


(0.0-0.3)


 


Aspartate Amino Transf


(AST/SGOT) 127 U/L


(15-37)  H


 


Alanine Aminotransferase


(ALT/SGPT) 157 U/L


(12-78)  H


 


Alkaline Phosphatase


  63 U/L


()


 


Troponin I


  0.045 ng/mL


(0.000-0.056)


 


Total Protein


  7.0 G/DL


(6.4-8.2)


 


Albumin


  3.0 G/DL


(3.4-5.0)  L


 


Globulin 4.0 g/dL  


 


Albumin/Globulin Ratio


  0.8 (1.0-2.7)


L











Microbiology








 Date/Time


Source Procedure


Growth Status


 


 


 3/2/20 20:00


Rectum - Final


NO CARBAPENEM-RESISTANT ENTEROBACTERI... Complete


 


 3/2/20 20:00


Rectum VRE Culture - Final


NO VANCOMYCIN RESISTANT ENTEROCOCCUS ... Complete

















Ean Marin MD Mar 5, 2020 08:10

## 2020-03-05 NOTE — ANETHESIA PREOPERATIVE EVAL
Anesthesia Pre-op PMH/ROS


General


Date of Evaluation:  Mar 5, 2020


Anesthesiologist:  [place]


ASA Score:  ASA 4 - E


Mallampati Score


Class I : Soft palate, uvula, fauces, pillars visible


Class II: Soft palate, uvula, fauces visible


Class III: Soft palate, base of uvula visible


Class IV: Only hard plate visible


Mallampati Classification:  Class III


Surgeon:  Glenn


Diagnosis:  HEmatemesis


Surgical Procedure:  EGD


Anesthesia History:  none


Family History:  no anesthesia problems


Allergies:  


Coded Allergies:  


     No Known Allergies (Unverified , 3/2/20)


Medications:  see eMAR


Patient NPO?:  Yes


NPO Date:  Mar 5, 2020


NPO Time:  00:00





Past Medical History


Cardiovascular:  Reports: HTN, MI - h/o MI in 2012; presented with elevasted 

troponins, now downtrending; no current chest pain  or sob, other - CHF-acute 

on chronic, s/p pacemaker vs aicd placement on 2/3/20-patient unsure; 


   Denies: CAD, valve dz, arrhythmia


Pulmonary:  Denies: asthma, COPD, [place], other


Gastrointestinal/Genitourinary:  Denies: GERD, CRI, ESRD, other


Neurologic/Psychiatric:  Denies: dementia, CVA, depression/anxiety, TIA, other


Endocrine:  Reports: DM; 


   Denies: hypothyroidism, steroids, other


HEENT:  Denies: cataract (L), cataract (R), glaucoma, [place] (L), [place] (R), other


Hematology/Immune:  Denies: anemia, DVT, bleeding disorder, other


Musculoskeletal/Integumentary:  Denies: OA, RA, DJD, DDD, edema, other


Other:  obesity


PSxH Narrative:


Denies





Anesthesia Pre-op Phys. Exam


Physician Exam





Last Vital Signs








  Date Time  Temp Pulse Resp B/P (MAP) Pulse Ox O2 Delivery O2 Flow Rate FiO2


 


3/5/20 09:00      Room Air  


 


3/5/20 09:00    134/58    


 


3/5/20 09:00  128      


 


3/5/20 08:00 97.7  20  96   








Constitutional:  NAD


Cardiovascular:  RRR


Respiratory:  CTA





Airway Exam


Mallampati Score:  Class III


MO:  limited


ROM:  limited





Anesthesia Pre-op A/P


Labs





Hematology








Test


  3/5/20


03:56 3/5/20


08:30


 


White Blood Count


  12.9 K/UL


(4.8-10.8)  #H 15.0 K/UL


(4.8-10.8)  H


 


Red Blood Count


  4.30 M/UL


(4.70-6.10)  L 4.17 M/UL


(4.70-6.10)  L


 


Hemoglobin


  13.4 G/DL


(14.2-18.0)  L 13.1 G/DL


(14.2-18.0)  L


 


Hematocrit


  39.1 %


(42.0-52.0)  L 38.4 %


(42.0-52.0)  L


 


Mean Corpuscular Volume 91 FL (80-99)   92 FL (80-99)  


 


Mean Corpuscular Hemoglobin


  31.1 PG


(27.0-31.0)  H 31.4 PG


(27.0-31.0)  H


 


Mean Corpuscular Hemoglobin


Concent 34.2 G/DL


(32.0-36.0) 34.1 G/DL


(32.0-36.0)


 


Red Cell Distribution Width


  12.5 %


(11.6-14.8) 12.5 %


(11.6-14.8)


 


Platelet Count


  161 K/UL


(150-450) 181 K/UL


(150-450)


 


Mean Platelet Volume


  6.4 FL


(6.5-10.1)  L 6.4 FL


(6.5-10.1)  L


 


Neutrophils (%) (Auto)


  74.0 %


(45.0-75.0) 68.8 %


(45.0-75.0)


 


Lymphocytes (%) (Auto)


  18.8 %


(20.0-45.0)  L 23.0 %


(20.0-45.0)


 


Monocytes (%) (Auto)


  4.5 %


(1.0-10.0) 5.8 %


(1.0-10.0)


 


Eosinophils (%) (Auto)


  2.0 %


(0.0-3.0) 1.7 %


(0.0-3.0)


 


Basophils (%) (Auto)


  0.7 %


(0.0-2.0) 0.8 %


(0.0-2.0)








Coagulation








Test


  3/5/20


03:56


 


Prothrombin Time


  10.5 SEC


(9.30-11.50)


 


Prothromb Time International


Ratio 1.0 (0.9-1.1)  


 


 


Activated Partial


Thromboplast Time 51 SEC (23-33)


H








Chemistry








Test


  3/5/20


03:56 3/5/20


08:30


 


Sodium Level


  138 MMOL/L


(136-145) 


 


 


Potassium Level


  3.3 MMOL/L


(3.5-5.1)  L 


 


 


Chloride Level


  98 MMOL/L


() 


 


 


Carbon Dioxide Level


  31 MMOL/L


(21-32) 


 


 


Anion Gap


  9 mmol/L


(5-15) 


 


 


Blood Urea Nitrogen


  23 mg/dL


(7-18)  H 


 


 


Creatinine


  0.7 MG/DL


(0.55-1.30) 


 


 


Estimat Glomerular Filtration


Rate > 60 mL/min


(>60) 


 


 


Glucose Level


  147 MG/DL


()  H 


 


 


Calcium Level


  8.6 MG/DL


(8.5-10.1) 


 


 


Total Bilirubin


  1.3 MG/DL


(0.2-1.0)  H 


 


 


Direct Bilirubin


  0.3 MG/DL


(0.0-0.3) 


 


 


Aspartate Amino Transf


(AST/SGOT) 127 U/L


(15-37)  H 


 


 


Alanine Aminotransferase


(ALT/SGPT) 157 U/L


(12-78)  H 


 


 


Alkaline Phosphatase


  63 U/L


() 


 


 


Troponin I


  0.045 ng/mL


(0.000-0.056) 0.030 ng/mL


(0.000-0.056)


 


Total Protein


  7.0 G/DL


(6.4-8.2) 


 


 


Albumin


  3.0 G/DL


(3.4-5.0)  L 


 


 


Globulin 4.0 g/dL   


 


Albumin/Globulin Ratio


  0.8 (1.0-2.7)


L 


 











Studies


Pre-op Studies:  echo - EF 30%, severe left ventricular dysfunction





Risk Assessment & Plan


Assessment:


ASA ABELARDO


Plan:


MAC


Status Change Before Surgery:  No





Pre-Antibiotics


Drug:  N/A











Kennedi Jones MD Mar 5, 2020 12:22

## 2020-03-05 NOTE — IMMEDIATE POST-OP EVALUATION
Immediate Post-Op Evalulation


Immediate Post-Op Evalulation


Procedure:  EGD


Date of Evaluation:  Mar 5, 2020


Time of Evaluation:  13:12


IV Fluids:  300


Blood Products:  0


Estimated Blood Loss:  0


Urinary Output:  0


Blood Pressure Systolic:  101


Blood Pressure Diastolic:  67


Pulse Rate:  111


Respiratory Rate:  18


O2 Sat by Pulse Oximetry:  100


Temperature (Fahrenheit):  97.2


Pain Score (1-10):  0


Nausea:  No


Vomiting:  No


Complications


0


Patient Status:  awake, reacts, patent, none


Hydration Status:  adequate


Drug:  N/A











Kennedi Jones MD Mar 5, 2020 13:10

## 2020-03-05 NOTE — 48 HOUR POST ANESTHESIA EVAL
Post Anesthesia Evaluation


Procedure:  EGD


Date of Evaluation:  Mar 5, 2020


Airway:  patent


Nausea:  No


Vomiting:  No


Pain Intensity:  0


Hydration Status:  adequate


Cardiopulmonary Status:


at baseline


Mental Status/LOC:  patient returned to baseline


Post-Anesthesia Complications:


0


Follow-up care needed:  N/A - further care as per primary team











Kennedi Jones MD Mar 5, 2020 13:11

## 2020-03-05 NOTE — ENDOSCOPY PROCEDURE NOTE
Endoscopy Procedure Note


General


Indication for Procedure:  gib


Procedures Performed:  EGD


Operative Findings/Diagnosis:  MVT


Specimen:  none


Pt Tolerated Procedure Well:  Yes


Estimated Blood Loss:  none





Anesthesia


Anesthesiologist:  montana


Anesthesia:  MAC





Inserted Devices


Implant(s) used?:  No





GI Core Measures


50 yrs or older w/o bx or poly:  Not Applicable


10yrs. F/U recommended:  Not Applicable











Elier Elizabeth MD Mar 5, 2020 13:01

## 2020-03-05 NOTE — PRE-PROCEDURE NOTE/ATTESTATION
Pre-Procedure Note/Attestation


Complete Prior to Procedure


Planned Procedure:  not applicable


Procedure Narrative:


egd





Indications for Procedure


Pre-Operative Diagnosis:


gib





Attestation


I attest that I discussed the nature of the procedure; its benefits; risks and 

complications; and alternatives (and the risks and benefits of such alternatives

), prior to the procedure, with the patient (or the patient's legal 

representative).





I attest that, if there was a reasonable possibility of needing a blood 

transfusion, the patient (or the patient's legal representative) was given the 

[facility] standardized written summary, pursuant 

to the [employer] Blood Safety Act ([place] Health and Safety Code # 1645, as 

amended).





I attest that I re-evaluated the patient just prior to the surgery and that 

there has been no change in the patient's H&P, except as documented below:











Elier Elizabeth MD Mar 5, 2020 12:16

## 2020-03-05 NOTE — GENERAL PROGRESS NOTE
Assessment/Plan


Problem List:  


(1) Metabolic acidosis


ICD Codes:  E87.2 - Acidosis


SNOMED:  93580510, 654803027, 938468796


(2) Elevated troponin


ICD Codes:  R79.89 - Other specified abnormal findings of blood chemistry


SNOMED:  880715413, 708019780, 267460462


(3) Chest pain


ICD Codes:  R07.9 - Chest pain, unspecified


SNOMED:  84295389


(4) Abnormal LFTs


ICD Codes:  R94.5 - Abnormal results of liver function studies


SNOMED:  310705901


(5) Hematemesis


ICD Codes:  K92.0 - Hematemesis


SNOMED:  6526218


Status:  deteriorating


Assessment/Plan:


Mr. Yarbrough is a 65 year old male with hx of systolic CHF, HTN, presenting with 

acute onset chest pain, found to have transaminitis. 





#Chest pain


#ACS


#HTN


#Chronic systolic CHF, EF 30% 


-Cardiology consult


-s/p heparin gtt x 48 hrs (3/3-3/5) 


-Plan for stress test today.


-d/c Lasix for hematemesis and NPO. 


-Continue home coreg 6.25 BID


-Continue losartan 25 daily 


-TTE with EF 30%. severe LV dysfunction. 





#Hematemesis


#GIB


-New on AM of 3/5.


-Start protonix gtt (3/5 -)


-Start NS at 100 mL/hr 


-Recheck CBC this AM. No significant drop overnight. 


-GI notified. 





#Transaminitis


#Abdominal pain


-GI consult placed. appreciate recs. 


-CT A/P unremarkable.





Time spent on encounter: 35 mins, >50% on counseling and coordination of care.





Time of note doesn't reflect time of encounter.





Subjective


Date patient seen:  Mar 5, 2020


Time patient seen:  10:30


Constitutional:  Denies: no symptoms, chills, diaphoresis, fever, malaise, 

weakness, other


HEENT:  Denies: no symptoms, eye pain, blurred vision, tearing, double vision, 

ear pain, ear discharge, nose pain, nose congestion, throat pain, throat 

swelling, mouth pain, mouth swelling, other


Cardiovascular:  Denies: no symptoms, chest pain, edema, irregular heart rate, 

lightheadedness, palpitations, syncope, other


Respiratory:  Denies: no symptoms, cough, orthopnea, shortness of breath, SOB 

with excertion, SOB at rest, sputum, stridor, wheezing, other


Gastrointestinal/Abdominal:  Reports: no symptoms, abdomen distended, abdominal 

pain, black stools, tarry stools, blood in stool, constipated, diarrhea, 

difficulty swallowing, nausea, poor appetite, poor fluid intake, rectal bleeding

, vomiting, other


Genitourinary:  Denies: no symptoms, burning, discharge, frequency, flank pain, 

hematuria, incontinence, pain, urgency, other


Neurologic/Psychiatric:  Denies: no symptoms, anxiety, depressed, emotional 

problems, headache, numbness, paresthesia, pre-existing deficit, seizure, 

tingling, tremors, weakness, other


Endocrine:  Denies: no symptoms, excessive sweating, flushing, intolerance to 

cold, intolerance to heat, increased hunger, increased thirst, increased urine, 

unexplained weight gain, unexplained weight loss, other


Hematologic/Lymphatic:  Denies: no symptoms, anemia, easy bleeding, easy 

bruising, other


Allergies:  


Coded Allergies:  


     No Known Allergies (Unverified , 3/2/20)


Subjective


complaining of thirst, wants to drink. has been npo for a long time.


-vomited blood this AM, stress test delayed.





Objective





Last 24 Hour Vital Signs








  Date Time  Temp Pulse Resp B/P (MAP) Pulse Ox O2 Delivery O2 Flow Rate FiO2


 


3/5/20 09:00      Room Air  


 


3/5/20 09:00    134/58    


 


3/5/20 09:00  128  134/58    


 


3/5/20 08:00  128      


 


3/5/20 08:00 97.7 65 20 134/58 (83) 96   


 


3/5/20 04:00 95.9 104 18 127/83 (98) 96   


 


3/5/20 04:00  106      


 


3/5/20 00:00 97.9 90 18 130/70 (90) 95   


 


3/5/20 00:00  96      


 


3/4/20 21:00      Room Air  


 


3/4/20 20:00  82      


 


3/4/20 20:00 96.6 93 18 125/78 (94) 96   


 


3/4/20 16:00  90      


 


3/4/20 16:00 97.5 87 18 130/76 (94) 95   


 


3/4/20 12:00  87      


 


3/4/20 12:00 98.1 90 18 141/88 (105) 94   

















Intake and Output  


 


 3/4/20 3/5/20





 19:00 07:00


 


Intake Total 518.195 ml 320.281 ml


 


Balance 518.195 ml 320.281 ml


 


  


 


Intake Oral 490 ml 


 


IV Total 28.195 ml 320.281 ml


 


# Voids 1 3








Laboratory Tests


3/5/20 03:56: 


White Blood Count 12.9#H, Red Blood Count 4.30L, Hemoglobin 13.4L, Hematocrit 

39.1L, Mean Corpuscular Volume 91, Mean Corpuscular Hemoglobin 31.1H, Mean 

Corpuscular Hemoglobin Concent 34.2, Red Cell Distribution Width 12.5, Platelet 

Count 161, Mean Platelet Volume 6.4L, Neutrophils (%) (Auto) 74.0, Lymphocytes (

%) (Auto) 18.8L, Monocytes (%) (Auto) 4.5, Eosinophils (%) (Auto) 2.0, 

Basophils (%) (Auto) 0.7, Prothrombin Time 10.5, Prothromb Time International 

Ratio 1.0, Activated Partial Thromboplast Time 51H, Sodium Level 138, Potassium 

Level 3.3L, Chloride Level 98, Carbon Dioxide Level 31, Anion Gap 9, Blood Urea 

Nitrogen 23H, Creatinine 0.7, Estimat Glomerular Filtration Rate > 60, Glucose 

Level 147H, Calcium Level 8.6, Total Bilirubin 1.3H, Direct Bilirubin 0.3, 

Aspartate Amino Transf (AST/SGOT) 127H, Alanine Aminotransferase (ALT/SGPT) 157H

, Alkaline Phosphatase 63, Troponin I 0.045, Total Protein 7.0, Albumin 3.0L, 

Globulin 4.0, Albumin/Globulin Ratio 0.8L


3/5/20 08:30: 


White Blood Count 15.0H, Red Blood Count 4.17L, Hemoglobin 13.1L, Hematocrit 

38.4L, Mean Corpuscular Volume 92, Mean Corpuscular Hemoglobin 31.4H, Mean 

Corpuscular Hemoglobin Concent 34.1, Red Cell Distribution Width 12.5, Platelet 

Count 181, Mean Platelet Volume 6.4L, Neutrophils (%) (Auto) 68.8, Lymphocytes (

%) (Auto) 23.0, Monocytes (%) (Auto) 5.8, Eosinophils (%) (Auto) 1.7, Basophils 

(%) (Auto) 0.8, Troponin I 0.030


Height (Feet):  5


Height (Inches):  6.00


Weight (Pounds):  235


General Appearance:  no apparent distress, alert


Neck:  supple


Cardiovascular:  normal rate, regular rhythm


Respiratory/Chest:  lungs clear, normal breath sounds


Abdomen:  non tender, soft











Jessie Webster M.D. Mar 5, 2020 10:57

## 2020-03-05 NOTE — PROCEDURE NOTE
DATE OF PROCEDURE:  03/05/2020

SURGEON:  Elier Elizabeth M.D.



PROCEDURE:  Upper endoscopy with hemostasis.



ANESTHESIA:  Per Dr. Briceno.



INSTRUMENT:  Olympus adult flexible upper endoscope.



INDICATION:  Upper GI bleeding.



REASON FOR PROCEDURE:  The procedure, risks, benefits, and possible

consequences, including hemorrhage, aspiration, perforation and infection,

and alternative treatments, were explained to the patient/legal guardian

by Dr. Elier Elizabeth and the patient/legal guardian understood and

accepted these risks.



DESCRIPTION OF PROCEDURE:  After informed consent was obtained and the

patient was adequately sedated, Olympus upper endoscope was advanced from

mouth into the second portion of the duodenum and retroflexion was

performed in the stomach.



The patient had active upper GI bleeding, most probably from a

Lilly-Howard tear at the GE junction with a blood clot sitting at the GE

junction.  There was also blood clot throughout the stomach and duodenum,

but this seems to have started from the GE junction.



We injected that area with 4 mL of 1:10,000 dilution epinephrine.  We

decided not to do hemoclip or cauterization given there was a clot and

actually the bleeding was little bit under control with a clot sitting on

it.



The patient tolerated the procedure well without complication.



SUMMARY OF FINDINGS:  Upper GI bleeding, most probably from a Lilly-Howard

tear.



RECOMMENDATIONS:  Status post hemostasis with 4 mL of epinephrine

injection.  ______ the patient to be admitted to ICU.  Protonix drip.

Monitor hemoglobin and hematocrit.  Transfuse as needed to keep hemoglobin

above 7.  Consider re-endoscopy if the patient shows signs and symptoms of

re-bleeding.



I want to thank Dr. Cuellar for this kind referral.









  ______________________________________________

  Elier Elizabeth M.D.





DR:  KATIE

D:  03/05/2020 13:26

T:  03/05/2020 16:42

JOB#:  [id number]

CC:  Ale Cuellar M.D.; Fax#:  [phone]

## 2020-03-06 VITALS — DIASTOLIC BLOOD PRESSURE: 71 MMHG | SYSTOLIC BLOOD PRESSURE: 133 MMHG

## 2020-03-06 VITALS — DIASTOLIC BLOOD PRESSURE: 59 MMHG | SYSTOLIC BLOOD PRESSURE: 104 MMHG

## 2020-03-06 VITALS — SYSTOLIC BLOOD PRESSURE: 109 MMHG | DIASTOLIC BLOOD PRESSURE: 78 MMHG

## 2020-03-06 VITALS — DIASTOLIC BLOOD PRESSURE: 80 MMHG | SYSTOLIC BLOOD PRESSURE: 114 MMHG

## 2020-03-06 VITALS — SYSTOLIC BLOOD PRESSURE: 104 MMHG | DIASTOLIC BLOOD PRESSURE: 57 MMHG

## 2020-03-06 VITALS — DIASTOLIC BLOOD PRESSURE: 73 MMHG | SYSTOLIC BLOOD PRESSURE: 116 MMHG

## 2020-03-06 VITALS — DIASTOLIC BLOOD PRESSURE: 49 MMHG | SYSTOLIC BLOOD PRESSURE: 88 MMHG

## 2020-03-06 VITALS — DIASTOLIC BLOOD PRESSURE: 60 MMHG | SYSTOLIC BLOOD PRESSURE: 119 MMHG

## 2020-03-06 VITALS — SYSTOLIC BLOOD PRESSURE: 105 MMHG | DIASTOLIC BLOOD PRESSURE: 72 MMHG

## 2020-03-06 VITALS — SYSTOLIC BLOOD PRESSURE: 114 MMHG | DIASTOLIC BLOOD PRESSURE: 74 MMHG

## 2020-03-06 VITALS — SYSTOLIC BLOOD PRESSURE: 85 MMHG | DIASTOLIC BLOOD PRESSURE: 45 MMHG

## 2020-03-06 VITALS — DIASTOLIC BLOOD PRESSURE: 49 MMHG | SYSTOLIC BLOOD PRESSURE: 78 MMHG

## 2020-03-06 VITALS — SYSTOLIC BLOOD PRESSURE: 101 MMHG | DIASTOLIC BLOOD PRESSURE: 65 MMHG

## 2020-03-06 VITALS — SYSTOLIC BLOOD PRESSURE: 103 MMHG | DIASTOLIC BLOOD PRESSURE: 61 MMHG

## 2020-03-06 VITALS — DIASTOLIC BLOOD PRESSURE: 54 MMHG | SYSTOLIC BLOOD PRESSURE: 102 MMHG

## 2020-03-06 LAB
ADD MANUAL DIFF: NO
BASOPHILS NFR BLD AUTO: 0.8 % (ref 0–2)
EOSINOPHIL NFR BLD AUTO: 0.8 % (ref 0–3)
ERYTHROCYTE [DISTWIDTH] IN BLOOD BY AUTOMATED COUNT: 13 % (ref 11.6–14.8)
HCT VFR BLD CALC: 31.4 % (ref 42–52)
HGB BLD-MCNC: 10.7 G/DL (ref 14.2–18)
LYMPHOCYTES NFR BLD AUTO: 27.5 % (ref 20–45)
MCV RBC AUTO: 93 FL (ref 80–99)
MONOCYTES NFR BLD AUTO: 6.8 % (ref 1–10)
NEUTROPHILS NFR BLD AUTO: 64.2 % (ref 45–75)
PLATELET # BLD: 139 K/UL (ref 150–450)
RBC # BLD AUTO: 3.38 M/UL (ref 4.7–6.1)
WBC # BLD AUTO: 9.5 K/UL (ref 4.8–10.8)

## 2020-03-06 RX ADMIN — DOCUSATE SODIUM SCH MG: 100 CAPSULE, LIQUID FILLED ORAL at 09:02

## 2020-03-06 RX ADMIN — SODIUM CHLORIDE SCH MG: 0.9 INJECTION INTRAVENOUS at 20:39

## 2020-03-06 RX ADMIN — SODIUM CHLORIDE SCH MG: 0.9 INJECTION INTRAVENOUS at 03:10

## 2020-03-06 RX ADMIN — DOCUSATE SODIUM SCH MG: 100 CAPSULE, LIQUID FILLED ORAL at 20:39

## 2020-03-06 RX ADMIN — SODIUM CHLORIDE SCH MG: 0.9 INJECTION INTRAVENOUS at 15:13

## 2020-03-06 RX ADMIN — PANTOPRAZOLE SODIUM SCH MG: 40 INJECTION, POWDER, FOR SOLUTION INTRAVENOUS at 20:40

## 2020-03-06 RX ADMIN — PANTOPRAZOLE SODIUM SCH MLS/HR: 40 INJECTION, POWDER, FOR SOLUTION INTRAVENOUS at 09:01

## 2020-03-06 RX ADMIN — SODIUM CHLORIDE SCH MG: 0.9 INJECTION INTRAVENOUS at 09:01

## 2020-03-06 RX ADMIN — DIPHENHYDRAMINE HYDROCHLORIDE PRN MG: 50 INJECTION INTRAMUSCULAR; INTRAVENOUS at 20:05

## 2020-03-06 NOTE — GENERAL PROGRESS NOTE
Assessment/Plan


Problem List:  


(1) Hematemesis


ICD Codes:  K92.0 - Hematemesis


SNOMED:  5379337


(2) Abnormal LFTs


ICD Codes:  R94.5 - Abnormal results of liver function studies


SNOMED:  401029340


(3) Chest pain


ICD Codes:  R07.9 - Chest pain, unspecified


SNOMED:  02268111


(4) Elevated troponin


ICD Codes:  R79.89 - Other specified abnormal findings of blood chemistry


SNOMED:  605749843, 618434934, 031651156


(5) Diverticulosis


ICD Codes:  K57.90 - Diverticulosis of intestine, part unspecified, without 

perforation or abscess without bleeding


SNOMED:  707986773


(6) Kidney calculi


ICD Codes:  N20.0 - Calculus of kidney


SNOMED:  71656443


(7) Fatty liver


ICD Codes:  K76.0 - Fatty (change of) liver, not elsewhere classified


SNOMED:  117029429


Status:  deteriorating


Assessment/Plan:


no recurrent bleed post EGD


change ppi to Q12


start clears


fu cbc


repeat lfts





Subjective


ROS Limited/Unobtainable:  Yes


Allergies:  


Coded Allergies:  


     No Known Allergies (Unverified , 3/2/20)





Objective





Last 24 Hour Vital Signs








  Date Time  Temp Pulse Resp B/P (MAP) Pulse Ox O2 Delivery O2 Flow Rate FiO2


 


3/6/20 09:01    114/74    


 


3/6/20 09:01  88  114/74    


 


3/6/20 08:00  89      


 


3/6/20 08:00      Room Air  


 


3/6/20 08:00 97.6 88 17 114/74 (87) 100   


 


3/6/20 07:28     100 Nasal Cannula 2.0 28


 


3/6/20 04:00      Nasal Cannula 2.0 


 


3/6/20 04:00 97.8 92 15 101/65 (77) 99   


 


3/6/20 04:00  96      


 


3/6/20 03:00  96 16 109/78 (88) 99   


 


3/6/20 02:00  97 18 104/59 (74) 100   


 


3/6/20 01:00  96 16 116/73 (87) 99   


 


3/6/20 00:00 97.8 98 17 114/80 (91) 100   


 


3/6/20 00:00  102      


 


3/5/20 23:00  100 17 107/72 (84) 100   


 


3/5/20 22:00  106 16 118/74 (89) 100   


 


3/5/20 21:00      Nasal Cannula 2.0 


 


3/5/20 21:00  111 24 124/68 (86) 100   


 


3/5/20 20:00  104      


 


3/5/20 20:00 97.9 105 17 120/79 (93) 98   


 


3/5/20 16:32  107 18 103/70 (81) 96   


 


3/5/20 16:30   18 87/63 (71) 96   


 


3/5/20 16:00 98.4  18 100/48 (65) 98   


 


3/5/20 16:00  123      


 


3/5/20 13:25 97.4 113 19 101/64 100 Nasal Cannula 3 


 


3/5/20 13:20  104 18 105/62 100 Nasal Cannula 3 


 


3/5/20 13:15  108 18 103/66 100 Nasal Cannula 3 


 


3/5/20 13:10  111 18  100   


 


3/5/20 13:07 97.2 111 14 101/70 100 Nasal Cannula 3 

















Intake and Output  


 


 3/5/20 3/6/20





 19:00 07:00


 


Intake Total 814.446 ml 1421.25 ml


 


Output Total 0 ml 600 ml


 


Balance 814.446 ml 821.25 ml


 


  


 


IV Total 814.446 ml 1421.25 ml


 


Output Urine Total  600 ml


 


Estimated Blood Loss 0 ml 


 


# Voids 1 1


 


# Bowel Movements  2








Laboratory Tests


3/6/20 03:15: 


White Blood Count 9.5, Red Blood Count 3.38L, Hemoglobin 10.7L, Hematocrit 31.4L

, Mean Corpuscular Volume 93, Mean Corpuscular Hemoglobin 31.6H, Mean 

Corpuscular Hemoglobin Concent 34.1, Red Cell Distribution Width 13.0, Platelet 

Count 139L, Mean Platelet Volume 6.3L, Neutrophils (%) (Auto) 64.2, Lymphocytes 

(%) (Auto) 27.5, Monocytes (%) (Auto) 6.8, Eosinophils (%) (Auto) 0.8, 

Basophils (%) (Auto) 0.8


Height (Feet):  5


Height (Inches):  6.00


Weight (Pounds):  210


General Appearance:  alert


EENT:  normal ENT inspection


Neck:  supple


Cardiovascular:  normal rate


Respiratory/Chest:  decreased breath sounds


Abdomen:  normal bowel sounds, non tender, soft


Extremities:  non-tender











Elier Elizabeth MD Mar 6, 2020 12:07

## 2020-03-06 NOTE — GENERAL PROGRESS NOTE
Assessment/Plan


Problem List:  


(1) Metabolic acidosis


ICD Codes:  E87.2 - Acidosis


SNOMED:  71651621, 681330808, 437692567


(2) Elevated troponin


ICD Codes:  R79.89 - Other specified abnormal findings of blood chemistry


SNOMED:  730774924, 878462030, 460736317


(3) Chest pain


ICD Codes:  R07.9 - Chest pain, unspecified


SNOMED:  78478289


(4) Abnormal LFTs


ICD Codes:  R94.5 - Abnormal results of liver function studies


SNOMED:  736218589


(5) Hematemesis


ICD Codes:  K92.0 - Hematemesis


SNOMED:  3673654


Status:  stable, progressing, deteriorating


Assessment/Plan:


Mr. Yarbrough is a 65 year old male with hx of systolic CHF, HTN, presenting with 

acute onset chest pain, found to have transaminitis. 





#Chest pain


#ACS


#HTN


#Chronic systolic CHF, EF 30% 


-Cardiology consult


-s/p heparin gtt x 48 hrs (3/3-3/5) 


-Plan for stress test per cards 


-d/c Lasix for hematemesis (see below). can restart later as needed. 


-Continue home coreg 6.25 BID


-Continue losartan 25 daily 


-TTE with EF 30%. severe LV dysfunction. 





#Hematemesis


#GIB


-s/p EGD and epi injection by GI (3/5)


-s/p protonix gtt, continue 40 mg BID.


-NS at 100 mL/hr. 


-start feeding. 


-PT ordered for weakness (3/6)





#Transaminitis


#Abdominal pain


-GI consult placed. appreciate recs. 


-CT A/P unremarkable.





Time spent on encounter: 45 minutes, including time spent on critical care of 

an acutely ill patient who underwent EGD for active upper GI bleeding.





Time of note doesn't reflect time of encounter.





Subjective


Date patient seen:  Mar 6, 2020


Constitutional:  Denies: no symptoms, chills, diaphoresis, fever, malaise, 

weakness, other


HEENT:  Denies: no symptoms, eye pain, blurred vision, tearing, double vision, 

ear pain, ear discharge, nose pain, nose congestion, throat pain, throat 

swelling, mouth pain, mouth swelling, other


Cardiovascular:  Denies: no symptoms, chest pain, edema, irregular heart rate, 

lightheadedness, palpitations, syncope, other


Respiratory:  Denies: no symptoms, cough, orthopnea, shortness of breath, SOB 

with excertion, SOB at rest, sputum, stridor, wheezing, other


Gastrointestinal/Abdominal:  Denies: no symptoms, abdomen distended, abdominal 

pain, black stools, tarry stools, blood in stool, constipated, diarrhea, 

difficulty swallowing, nausea, poor appetite, poor fluid intake, rectal bleeding

, vomiting, other


Genitourinary:  Denies: no symptoms, burning, discharge, frequency, flank pain, 

hematuria, incontinence, pain, urgency, other


Neurologic/Psychiatric:  Denies: no symptoms, anxiety, depressed, emotional 

problems, headache, numbness, paresthesia, pre-existing deficit, seizure, 

tingling, tremors, weakness, other


Endocrine:  Denies: no symptoms, excessive sweating, flushing, intolerance to 

cold, intolerance to heat, increased hunger, increased thirst, increased urine, 

unexplained weight gain, unexplained weight loss, other


Hematologic/Lymphatic:  Denies: no symptoms, anemia, easy bleeding, easy 

bruising, other


Allergies:  


Coded Allergies:  


     No Known Allergies (Unverified , 3/2/20)


Subjective


s/p EGD yesterday s/p epi injection. now resting in ICU. hungry, denies any 

more pain.





Objective





Last 24 Hour Vital Signs








  Date Time  Temp Pulse Resp B/P (MAP) Pulse Ox O2 Delivery O2 Flow Rate FiO2


 


3/6/20 09:01    114/74    


 


3/6/20 09:01  88  114/74    


 


3/6/20 08:00  89      


 


3/6/20 08:00      Room Air  


 


3/6/20 08:00 97.6 88 17 114/74 (87) 100   


 


3/6/20 07:28     100 Nasal Cannula 2.0 28


 


3/6/20 04:00      Nasal Cannula 2.0 


 


3/6/20 04:00 97.8 92 15 101/65 (77) 99   


 


3/6/20 04:00  96      


 


3/6/20 03:00  96 16 109/78 (88) 99   


 


3/6/20 02:00  97 18 104/59 (74) 100   


 


3/6/20 01:00  96 16 116/73 (87) 99   


 


3/6/20 00:00 97.8 98 17 114/80 (91) 100   


 


3/6/20 00:00  102      


 


3/5/20 23:00  100 17 107/72 (84) 100   


 


3/5/20 22:00  106 16 118/74 (89) 100   


 


3/5/20 21:00      Nasal Cannula 2.0 


 


3/5/20 21:00  111 24 124/68 (86) 100   


 


3/5/20 20:00  104      


 


3/5/20 20:00 97.9 105 17 120/79 (93) 98   


 


3/5/20 16:32  107 18 103/70 (81) 96   


 


3/5/20 16:30   18 87/63 (71) 96   


 


3/5/20 16:00 98.4  18 100/48 (65) 98   


 


3/5/20 16:00  123      


 


3/5/20 13:25 97.4 113 19 101/64 100 Nasal Cannula 3 


 


3/5/20 13:20  104 18 105/62 100 Nasal Cannula 3 


 


3/5/20 13:15  108 18 103/66 100 Nasal Cannula 3 


 


3/5/20 13:10  111 18  100   


 


3/5/20 13:07 97.2 111 14 101/70 100 Nasal Cannula 3 

















Intake and Output  


 


 3/5/20 3/6/20





 19:00 07:00


 


Intake Total 814.446 ml 1421.25 ml


 


Output Total 0 ml 600 ml


 


Balance 814.446 ml 821.25 ml


 


  


 


IV Total 814.446 ml 1421.25 ml


 


Output Urine Total  600 ml


 


Estimated Blood Loss 0 ml 


 


# Voids 1 1


 


# Bowel Movements  2








Laboratory Tests


3/6/20 03:15: 


White Blood Count 9.5, Red Blood Count 3.38L, Hemoglobin 10.7L, Hematocrit 31.4L

, Mean Corpuscular Volume 93, Mean Corpuscular Hemoglobin 31.6H, Mean 

Corpuscular Hemoglobin Concent 34.1, Red Cell Distribution Width 13.0, Platelet 

Count 139L, Mean Platelet Volume 6.3L, Neutrophils (%) (Auto) 64.2, Lymphocytes 

(%) (Auto) 27.5, Monocytes (%) (Auto) 6.8, Eosinophils (%) (Auto) 0.8, 

Basophils (%) (Auto) 0.8


Height (Feet):  5


Height (Inches):  6.00


Weight (Pounds):  210


General Appearance:  no apparent distress, alert


Neck:  supple


Cardiovascular:  normal rate, regular rhythm


Respiratory/Chest:  lungs clear, normal breath sounds


Abdomen:  non tender, soft











Jessie Webster M.D. Mar 6, 2020 12:25

## 2020-03-06 NOTE — CARDIOLOGY PROGRESS NOTE
Assessment/Plan


Status:  stable


Assessment/Plan


Assessment/Plan


Status:  stable


Assessment/Plan:


Assessment:


Chest pain


CHF acute on chronic


NSVT


Elevated troponin/ACS


Elevated LFT





Plan:


restart when cleared by GI :Aspirin/plavix


d/c heparin


Continue coreg/losartan


Statin


Nitro prn


Stress test Monday





Subjective


Cardiovascular:  Reports: no symptoms


Respiratory:  Reports: no symptoms


Gastrointestinal/Abdominal:  Reports: no symptoms


Genitourinary:  Reports: no symptoms


Subjective


Stress test delayed due to GI hemoptysis, heparin d/c.  Sent to ICU, s/p EGD 

showed jess mazariegos tear.  now bleeding stopped and tolerating clear liquid 

diet





Objective





Last 24 Hour Vital Signs








  Date Time  Temp Pulse Resp B/P (MAP) Pulse Ox O2 Delivery O2 Flow Rate FiO2


 


3/6/20 12:00  86      


 


3/6/20 09:01    114/74    


 


3/6/20 09:01  88  114/74    


 


3/6/20 08:00  89      


 


3/6/20 08:00      Room Air  


 


3/6/20 08:00 97.6 88 17 114/74 (87) 100   


 


3/6/20 07:28     100 Nasal Cannula 2.0 28


 


3/6/20 04:00      Nasal Cannula 2.0 


 


3/6/20 04:00 97.8 92 15 101/65 (77) 99   


 


3/6/20 04:00  96      


 


3/6/20 03:00  96 16 109/78 (88) 99   


 


3/6/20 02:00  97 18 104/59 (74) 100   


 


3/6/20 01:00  96 16 116/73 (87) 99   


 


3/6/20 00:00 97.8 98 17 114/80 (91) 100   


 


3/6/20 00:00  102      


 


3/5/20 23:00  100 17 107/72 (84) 100   


 


3/5/20 22:00  106 16 118/74 (89) 100   


 


3/5/20 21:00      Nasal Cannula 2.0 


 


3/5/20 21:00  111 24 124/68 (86) 100   


 


3/5/20 20:00  104      


 


3/5/20 20:00 97.9 105 17 120/79 (93) 98   


 


3/5/20 16:32  107 18 103/70 (81) 96   


 


3/5/20 16:30   18 87/63 (71) 96   


 


3/5/20 16:00 98.4  18 100/48 (65) 98   


 


3/5/20 16:00  123      








General Appearance:  no apparent distress, alert


EENT:  PERRL/EOMI, normal ENT inspection, TMs normal, pharynx normal


Neck:  non-tender, normal alignment, supple, normal inspection, no JVD


Rhythm:  NSR


Cardiovascular:  normal peripheral pulses, normal rate, regular rhythm


Respiratory/Chest:  chest wall non-tender, lungs clear, normal breath sounds


Abdomen:  normal bowel sounds, non tender, soft, no organomegaly


Extremities:  normal range of motion, non-tender


Neurologic:  CNs II-XII grossly normal, no motor/sensory deficits











Intake and Output  


 


 3/5/20 3/6/20





 19:00 07:00


 


Intake Total 814.446 ml 1421.25 ml


 


Output Total 0 ml 600 ml


 


Balance 814.446 ml 821.25 ml


 


  


 


IV Total 814.446 ml 1421.25 ml


 


Output Urine Total  600 ml


 


Estimated Blood Loss 0 ml 


 


# Voids 1 1


 


# Bowel Movements  2











Laboratory Tests








Test


  3/6/20


03:15


 


White Blood Count


  9.5 K/UL


(4.8-10.8)


 


Red Blood Count


  3.38 M/UL


(4.70-6.10)  L


 


Hemoglobin


  10.7 G/DL


(14.2-18.0)  L


 


Hematocrit


  31.4 %


(42.0-52.0)  L


 


Mean Corpuscular Volume 93 FL (80-99)  


 


Mean Corpuscular Hemoglobin


  31.6 PG


(27.0-31.0)  H


 


Mean Corpuscular Hemoglobin


Concent 34.1 G/DL


(32.0-36.0)


 


Red Cell Distribution Width


  13.0 %


(11.6-14.8)


 


Platelet Count


  139 K/UL


(150-450)  L


 


Mean Platelet Volume


  6.3 FL


(6.5-10.1)  L


 


Neutrophils (%) (Auto)


  64.2 %


(45.0-75.0)


 


Lymphocytes (%) (Auto)


  27.5 %


(20.0-45.0)


 


Monocytes (%) (Auto)


  6.8 %


(1.0-10.0)


 


Eosinophils (%) (Auto)


  0.8 %


(0.0-3.0)


 


Basophils (%) (Auto)


  0.8 %


(0.0-2.0)

















Ean Marin MD Mar 6, 2020 14:20

## 2020-03-06 NOTE — DIAGNOSTIC IMAGING REPORT
Indication: Leg pain and shortness of breath

 

Technique: Grayscale and duplex images of the  bilateral lower extremity veins

 

Comparison: None

 

Findings: Bilaterally,   grayscale and duplex images demonstrate no evidence of

intraluminal thrombus. Normal phasic Doppler waveforms, demonstrating normal

augmentation response and no evidence of valvular insufficiency. Greater saphenous

vein(s) and tibial veins are patent. Normal compressibility. 

 

Impression: Negative for evidence of lower extremity deep venous thrombosis 

bilaterally

## 2020-03-07 VITALS — SYSTOLIC BLOOD PRESSURE: 101 MMHG | DIASTOLIC BLOOD PRESSURE: 71 MMHG

## 2020-03-07 VITALS — DIASTOLIC BLOOD PRESSURE: 65 MMHG | SYSTOLIC BLOOD PRESSURE: 109 MMHG

## 2020-03-07 VITALS — SYSTOLIC BLOOD PRESSURE: 144 MMHG | DIASTOLIC BLOOD PRESSURE: 62 MMHG

## 2020-03-07 VITALS — SYSTOLIC BLOOD PRESSURE: 92 MMHG | DIASTOLIC BLOOD PRESSURE: 55 MMHG

## 2020-03-07 VITALS — DIASTOLIC BLOOD PRESSURE: 51 MMHG | SYSTOLIC BLOOD PRESSURE: 95 MMHG

## 2020-03-07 VITALS — SYSTOLIC BLOOD PRESSURE: 105 MMHG | DIASTOLIC BLOOD PRESSURE: 63 MMHG

## 2020-03-07 LAB
ADD MANUAL DIFF: NO
ALBUMIN SERPL-MCNC: 2.6 G/DL (ref 3.4–5)
ALBUMIN/GLOB SERPL: 0.8 {RATIO} (ref 1–2.7)
ALP SERPL-CCNC: 40 U/L (ref 46–116)
ALT SERPL-CCNC: 76 U/L (ref 12–78)
ANION GAP SERPL CALC-SCNC: 6 MMOL/L (ref 5–15)
AST SERPL-CCNC: 50 U/L (ref 15–37)
BASOPHILS NFR BLD AUTO: 1 % (ref 0–2)
BILIRUB SERPL-MCNC: 0.5 MG/DL (ref 0.2–1)
BUN SERPL-MCNC: 16 MG/DL (ref 7–18)
CALCIUM SERPL-MCNC: 8.3 MG/DL (ref 8.5–10.1)
CHLORIDE SERPL-SCNC: 105 MMOL/L (ref 98–107)
CO2 SERPL-SCNC: 30 MMOL/L (ref 21–32)
CREAT SERPL-MCNC: 0.6 MG/DL (ref 0.55–1.3)
EOSINOPHIL NFR BLD AUTO: 4.2 % (ref 0–3)
ERYTHROCYTE [DISTWIDTH] IN BLOOD BY AUTOMATED COUNT: 13.6 % (ref 11.6–14.8)
GLOBULIN SER-MCNC: 3.3 G/DL
HCT VFR BLD CALC: 25.7 % (ref 42–52)
HGB BLD-MCNC: 8.6 G/DL (ref 14.2–18)
LYMPHOCYTES NFR BLD AUTO: 31 % (ref 20–45)
MCV RBC AUTO: 94 FL (ref 80–99)
MONOCYTES NFR BLD AUTO: 7.3 % (ref 1–10)
NEUTROPHILS NFR BLD AUTO: 56.6 % (ref 45–75)
PLATELET # BLD: 111 K/UL (ref 150–450)
POTASSIUM SERPL-SCNC: 3.5 MMOL/L (ref 3.5–5.1)
RBC # BLD AUTO: 2.72 M/UL (ref 4.7–6.1)
SODIUM SERPL-SCNC: 141 MMOL/L (ref 136–145)
WBC # BLD AUTO: 5.7 K/UL (ref 4.8–10.8)

## 2020-03-07 RX ADMIN — SODIUM CHLORIDE SCH MG: 0.9 INJECTION INTRAVENOUS at 20:23

## 2020-03-07 RX ADMIN — SODIUM CHLORIDE SCH MG: 0.9 INJECTION INTRAVENOUS at 09:12

## 2020-03-07 RX ADMIN — DOCUSATE SODIUM SCH MG: 100 CAPSULE, LIQUID FILLED ORAL at 09:13

## 2020-03-07 RX ADMIN — SUCRALFATE SCH GM: 1 TABLET ORAL at 13:20

## 2020-03-07 RX ADMIN — PANTOPRAZOLE SODIUM SCH MG: 40 INJECTION, POWDER, FOR SOLUTION INTRAVENOUS at 20:23

## 2020-03-07 RX ADMIN — DIPHENHYDRAMINE HYDROCHLORIDE PRN MG: 50 INJECTION INTRAMUSCULAR; INTRAVENOUS at 04:09

## 2020-03-07 RX ADMIN — LOSARTAN POTASSIUM SCH MG: 25 TABLET, FILM COATED ORAL at 09:13

## 2020-03-07 RX ADMIN — SUCRALFATE SCH GM: 1 TABLET ORAL at 17:48

## 2020-03-07 RX ADMIN — PANTOPRAZOLE SODIUM SCH MG: 40 INJECTION, POWDER, FOR SOLUTION INTRAVENOUS at 09:12

## 2020-03-07 RX ADMIN — DOCUSATE SODIUM SCH MG: 100 CAPSULE, LIQUID FILLED ORAL at 20:23

## 2020-03-07 RX ADMIN — SUCRALFATE SCH GM: 1 TABLET ORAL at 20:23

## 2020-03-07 RX ADMIN — SODIUM CHLORIDE SCH MG: 0.9 INJECTION INTRAVENOUS at 03:11

## 2020-03-07 RX ADMIN — CARVEDILOL SCH MG: 6.25 TABLET, FILM COATED ORAL at 09:13

## 2020-03-07 RX ADMIN — SODIUM CHLORIDE SCH MG: 0.9 INJECTION INTRAVENOUS at 15:13

## 2020-03-07 NOTE — GENERAL PROGRESS NOTE
Assessment/Plan


Problem List:  


(1) Hematemesis


ICD Codes:  K92.0 - Hematemesis


SNOMED:  6737115


(2) Abnormal LFTs


ICD Codes:  R94.5 - Abnormal results of liver function studies


SNOMED:  582404099


(3) Chest pain


ICD Codes:  R07.9 - Chest pain, unspecified


SNOMED:  15240352


(4) Elevated troponin


ICD Codes:  R79.89 - Other specified abnormal findings of blood chemistry


SNOMED:  382346155, 727138032, 627669976


(5) Diverticulosis


ICD Codes:  K57.90 - Diverticulosis of intestine, part unspecified, without 

perforation or abscess without bleeding


SNOMED:  651556500


(6) Kidney calculi


ICD Codes:  N20.0 - Calculus of kidney


SNOMED:  34074995


(7) Fatty liver


ICD Codes:  K76.0 - Fatty (change of) liver, not elsewhere classified


SNOMED:  376875415


Status:  stable


Assessment/Plan:


no recurrent bleed post EGD


ppi to Q12


fu cbc


repeat lfts>>>improving


add carafate


pending cardiac stress test





Subjective


ROS Limited/Unobtainable:  Yes


Allergies:  


Coded Allergies:  


     No Known Allergies (Unverified , 3/2/20)





Objective





Last 24 Hour Vital Signs








  Date Time  Temp Pulse Resp B/P (MAP) Pulse Ox O2 Delivery O2 Flow Rate FiO2


 


3/7/20 08:39      Nasal Cannula 2.0 


 


3/7/20 08:00 97.6 94 19 144/62 (89) 97   


 


3/7/20 04:00  88      


 


3/7/20 04:00 98.4 89 18 105/63 (77) 97   


 


3/7/20 00:00  91      


 


3/7/20 00:00 98.0 91 20 109/65 (80) 98   


 


3/6/20 21:00      Nasal Cannula 2.0 


 


3/6/20 20:00 98.0 89 20 119/60 (79) 100   


 


3/6/20 20:00  89      


 


3/6/20 18:47 98.6 88 16 133/71 (91) 98   


 


3/6/20 16:00  88      


 


3/6/20 16:00 97.9 88 16 105/72 (83) 98   


 


3/6/20 14:00  83 19 102/54 (70) 100   


 


3/6/20 13:00  83 18 103/61 (75) 100   


 


3/6/20 12:00 98.0 86 18 104/57 (73) 100   


 


3/6/20 12:00  86      


 


3/6/20 11:00  88 17 88/49 (62) 100   


 


3/6/20 10:00  88 17 85/45 (58) 100   

















Intake and Output  


 


 3/6/20 3/7/20





 19:00 07:00


 


Intake Total 400 ml 140 ml


 


Output Total  1600 ml


 


Balance 400 ml -1460 ml


 


  


 


Intake Oral  140 ml


 


IV Total 400 ml 


 


Output Urine Total  1600 ml


 


Stool Total  0 ml


 


# Voids  6








Laboratory Tests


3/7/20 06:10: 


White Blood Count 5.7, Red Blood Count 2.72L, Hemoglobin 8.6L, Hematocrit 25.7L

, Mean Corpuscular Volume 94, Mean Corpuscular Hemoglobin 31.6H, Mean 

Corpuscular Hemoglobin Concent 33.6, Red Cell Distribution Width 13.6, Platelet 

Count 111L, Mean Platelet Volume 6.5, Neutrophils (%) (Auto) 56.6, Lymphocytes (

%) (Auto) 31.0, Monocytes (%) (Auto) 7.3, Eosinophils (%) (Auto) 4.2H, 

Basophils (%) (Auto) 1.0, Sodium Level 141, Potassium Level 3.5, Chloride Level 

105, Carbon Dioxide Level 30, Anion Gap 6, Blood Urea Nitrogen 16, Creatinine 

0.6, Estimat Glomerular Filtration Rate > 60, Glucose Level 116H, Calcium Level 

8.3L, Total Bilirubin 0.5, Aspartate Amino Transf (AST/SGOT) 50H, Alanine 

Aminotransferase (ALT/SGPT) 76, Alkaline Phosphatase 40L, Total Protein 5.9L, 

Albumin 2.6L, Globulin 3.3, Albumin/Globulin Ratio 0.8L


Height (Feet):  5


Height (Inches):  6.00


Weight (Pounds):  214


General Appearance:  alert


EENT:  normal ENT inspection


Neck:  supple


Cardiovascular:  normal rate


Respiratory/Chest:  decreased breath sounds


Abdomen:  normal bowel sounds, non tender, soft


Extremities:  non-tender











Elier Elizabeth MD Mar 7, 2020 09:11

## 2020-03-07 NOTE — GENERAL PROGRESS NOTE
Assessment/Plan


Status:  stable


Assessment/Plan:


Mr. Yarbrough is a 65 year old male with hx of systolic CHF, HTN, presenting with 

acute onset chest pain, found to have transaminitis. 





#Chest pain


#Elevated troponins/ACS


#HTN


#Acute on Chronic systolic CHF, HFrEF 30% 


#NSVT


-s/p heparin gtt x 48 hrs (3/3-3/5) 


-TTE with EF 30%. severe LV dysfunction. 


-Continue coreg 6.25 BID, losartan 25 daily 


-d/c Lasix for hematemesis (see below). can restart later as needed. 


-Cardiology following: restart ASA/Plavix when cleared by GI, plan for stress 

test monday





#Hematemesis


#GIB


#acute blood loss anemia


-no signs of bleeding, cont. to monitor


-transfuse for Hgb <7


-s/p EGD and epi injection by GI (3/5)


-s/p protonix gtt


-cont. PPi BID


-NS at 100 mL/hr


-start feeding. 


-PT ordered for weakness (3/6)


-GI following: carafate





#Transaminitis


#Abdominal pain


-CT A/P unremarkable.


-GI following, reccs appreciated





Time spent on encounter: 65 minutes, >50% time spent on chart review, pt 

counseling and coordination of care.





Time of note doesn't reflect time of encounter.





Subjective


Constitutional:  Denies: no symptoms, chills, diaphoresis, fever, malaise, 

weakness, other


HEENT:  Denies: no symptoms, eye pain, blurred vision, tearing, double vision, 

ear pain, ear discharge, nose pain, nose congestion, throat pain, throat 

swelling, mouth pain, mouth swelling, other


Cardiovascular:  Denies: no symptoms, chest pain, edema, irregular heart rate, 

lightheadedness, palpitations, syncope, other


Respiratory:  Denies: no symptoms, cough, orthopnea, shortness of breath, SOB 

with excertion, SOB at rest, sputum, stridor, wheezing, other


Gastrointestinal/Abdominal:  Denies: no symptoms, abdomen distended, abdominal 

pain, black stools, tarry stools, blood in stool, constipated, diarrhea, 

difficulty swallowing, nausea, poor appetite, poor fluid intake, rectal bleeding

, vomiting, other


Genitourinary:  Denies: no symptoms, burning, discharge, frequency, flank pain, 

hematuria, incontinence, pain, urgency, other


Neurologic/Psychiatric:  Denies: no symptoms, anxiety, depressed, emotional 

problems, headache, numbness, paresthesia, pre-existing deficit, seizure, 

tingling, tremors, weakness, other


Endocrine:  Denies: no symptoms, excessive sweating, flushing, intolerance to 

cold, intolerance to heat, increased hunger, increased thirst, increased urine, 

unexplained weight gain, unexplained weight loss, other


Hematologic/Lymphatic:  Denies: no symptoms, anemia, easy bleeding, easy 

bruising, other


Allergies:  


Coded Allergies:  


     No Known Allergies (Unverified , 3/2/20)


Subjective


Pt notes vivid dreams qHS that woke him up, states he feels anxious w.mild HA. 

Denies vision changes, N/V, SOB, abd pain, dysuria or constipation at this time.





Objective





Last 24 Hour Vital Signs








  Date Time  Temp Pulse Resp B/P (MAP) Pulse Ox O2 Delivery O2 Flow Rate FiO2


 


3/7/20 09:13    144/62    


 


3/7/20 09:13  94  144/62    


 


3/7/20 08:39      Nasal Cannula 2.0 


 


3/7/20 08:00  91      


 


3/7/20 08:00 97.6 94 19 144/62 (89) 97   


 


3/7/20 04:00  88      


 


3/7/20 04:00 98.4 89 18 105/63 (77) 97   


 


3/7/20 00:00  91      


 


3/7/20 00:00 98.0 91 20 109/65 (80) 98   


 


3/6/20 21:00      Nasal Cannula 2.0 


 


3/6/20 20:00 98.0 89 20 119/60 (79) 100   


 


3/6/20 20:00  89      


 


3/6/20 18:47 98.6 88 16 133/71 (91) 98   


 


3/6/20 16:00  88      


 


3/6/20 16:00 97.9 88 16 105/72 (83) 98   


 


3/6/20 14:00  83 19 102/54 (70) 100   


 


3/6/20 13:00  83 18 103/61 (75) 100   


 


3/6/20 12:00 98.0 86 18 104/57 (73) 100   


 


3/6/20 12:00  86      


 


3/6/20 11:00  88 17 88/49 (62) 100   

















Intake and Output  


 


 3/6/20 3/7/20





 19:00 07:00


 


Intake Total 400 ml 140 ml


 


Output Total  1600 ml


 


Balance 400 ml -1460 ml


 


  


 


Intake Oral  140 ml


 


IV Total 400 ml 


 


Output Urine Total  1600 ml


 


Stool Total  0 ml


 


# Voids  6








Laboratory Tests


3/7/20 06:10: 


White Blood Count 5.7, Red Blood Count 2.72L, Hemoglobin 8.6L, Hematocrit 25.7L

, Mean Corpuscular Volume 94, Mean Corpuscular Hemoglobin 31.6H, Mean 

Corpuscular Hemoglobin Concent 33.6, Red Cell Distribution Width 13.6, Platelet 

Count 111L, Mean Platelet Volume 6.5, Neutrophils (%) (Auto) 56.6, Lymphocytes (

%) (Auto) 31.0, Monocytes (%) (Auto) 7.3, Eosinophils (%) (Auto) 4.2H, 

Basophils (%) (Auto) 1.0, Sodium Level 141, Potassium Level 3.5, Chloride Level 

105, Carbon Dioxide Level 30, Anion Gap 6, Blood Urea Nitrogen 16, Creatinine 

0.6, Estimat Glomerular Filtration Rate > 60, Glucose Level 116H, Calcium Level 

8.3L, Total Bilirubin 0.5, Aspartate Amino Transf (AST/SGOT) 50H, Alanine 

Aminotransferase (ALT/SGPT) 76, Alkaline Phosphatase 40L, Total Protein 5.9L, 

Albumin 2.6L, Globulin 3.3, Albumin/Globulin Ratio 0.8L


Height (Feet):  5


Height (Inches):  6.00


Weight (Pounds):  214


Objective


General Appearance:  no apparent distress, alert


Neck:  supple


Cardiovascular:  normal rate, regular rhythm


Respiratory/Chest:  lungs clear, normal breath sounds


Abdomen:  non tender, soft


Ext: no edema











Hernan Valladares M.D. Mar 7, 2020 10:12

## 2020-03-08 VITALS — SYSTOLIC BLOOD PRESSURE: 116 MMHG | DIASTOLIC BLOOD PRESSURE: 70 MMHG

## 2020-03-08 VITALS — SYSTOLIC BLOOD PRESSURE: 153 MMHG | DIASTOLIC BLOOD PRESSURE: 83 MMHG

## 2020-03-08 VITALS — DIASTOLIC BLOOD PRESSURE: 87 MMHG | SYSTOLIC BLOOD PRESSURE: 142 MMHG

## 2020-03-08 VITALS — DIASTOLIC BLOOD PRESSURE: 87 MMHG | SYSTOLIC BLOOD PRESSURE: 151 MMHG

## 2020-03-08 VITALS — DIASTOLIC BLOOD PRESSURE: 71 MMHG | SYSTOLIC BLOOD PRESSURE: 142 MMHG

## 2020-03-08 VITALS — DIASTOLIC BLOOD PRESSURE: 69 MMHG | SYSTOLIC BLOOD PRESSURE: 118 MMHG

## 2020-03-08 LAB
ADD MANUAL DIFF: NO
ALBUMIN SERPL-MCNC: 2.6 G/DL (ref 3.4–5)
ALBUMIN/GLOB SERPL: 0.8 {RATIO} (ref 1–2.7)
ALP SERPL-CCNC: 39 U/L (ref 46–116)
ALT SERPL-CCNC: 67 U/L (ref 12–78)
ANION GAP SERPL CALC-SCNC: 5 MMOL/L (ref 5–15)
AST SERPL-CCNC: 42 U/L (ref 15–37)
BASOPHILS NFR BLD AUTO: 0.7 % (ref 0–2)
BILIRUB SERPL-MCNC: 0.4 MG/DL (ref 0.2–1)
BUN SERPL-MCNC: 11 MG/DL (ref 7–18)
CALCIUM SERPL-MCNC: 8.2 MG/DL (ref 8.5–10.1)
CHLORIDE SERPL-SCNC: 104 MMOL/L (ref 98–107)
CO2 SERPL-SCNC: 31 MMOL/L (ref 21–32)
CREAT SERPL-MCNC: 0.7 MG/DL (ref 0.55–1.3)
EOSINOPHIL NFR BLD AUTO: 3.6 % (ref 0–3)
ERYTHROCYTE [DISTWIDTH] IN BLOOD BY AUTOMATED COUNT: 13.2 % (ref 11.6–14.8)
GLOBULIN SER-MCNC: 3.3 G/DL
HCT VFR BLD CALC: 24.2 % (ref 42–52)
HGB BLD-MCNC: 8.1 G/DL (ref 14.2–18)
LYMPHOCYTES NFR BLD AUTO: 35.4 % (ref 20–45)
MCV RBC AUTO: 94 FL (ref 80–99)
MONOCYTES NFR BLD AUTO: 8 % (ref 1–10)
NEUTROPHILS NFR BLD AUTO: 52.2 % (ref 45–75)
PLATELET # BLD: 112 K/UL (ref 150–450)
POTASSIUM SERPL-SCNC: 3.5 MMOL/L (ref 3.5–5.1)
RBC # BLD AUTO: 2.57 M/UL (ref 4.7–6.1)
SODIUM SERPL-SCNC: 140 MMOL/L (ref 136–145)
WBC # BLD AUTO: 5.2 K/UL (ref 4.8–10.8)

## 2020-03-08 RX ADMIN — SODIUM CHLORIDE SCH MG: 0.9 INJECTION INTRAVENOUS at 22:35

## 2020-03-08 RX ADMIN — DOCUSATE SODIUM SCH MG: 100 CAPSULE, LIQUID FILLED ORAL at 22:35

## 2020-03-08 RX ADMIN — DOCUSATE SODIUM SCH MG: 100 CAPSULE, LIQUID FILLED ORAL at 09:25

## 2020-03-08 RX ADMIN — LOSARTAN POTASSIUM SCH MG: 25 TABLET, FILM COATED ORAL at 09:25

## 2020-03-08 RX ADMIN — SODIUM CHLORIDE SCH MG: 0.9 INJECTION INTRAVENOUS at 03:45

## 2020-03-08 RX ADMIN — PANTOPRAZOLE SODIUM SCH MG: 40 INJECTION, POWDER, FOR SOLUTION INTRAVENOUS at 22:35

## 2020-03-08 RX ADMIN — SUCRALFATE SCH GM: 1 TABLET ORAL at 13:17

## 2020-03-08 RX ADMIN — SODIUM CHLORIDE SCH MG: 0.9 INJECTION INTRAVENOUS at 15:00

## 2020-03-08 RX ADMIN — SUCRALFATE SCH GM: 1 TABLET ORAL at 17:48

## 2020-03-08 RX ADMIN — SUCRALFATE SCH GM: 1 TABLET ORAL at 09:25

## 2020-03-08 RX ADMIN — SODIUM CHLORIDE SCH MG: 0.9 INJECTION INTRAVENOUS at 09:25

## 2020-03-08 RX ADMIN — PANTOPRAZOLE SODIUM SCH MG: 40 INJECTION, POWDER, FOR SOLUTION INTRAVENOUS at 09:25

## 2020-03-08 RX ADMIN — CARVEDILOL SCH MG: 6.25 TABLET, FILM COATED ORAL at 09:25

## 2020-03-08 RX ADMIN — SUCRALFATE SCH GM: 1 TABLET ORAL at 22:35

## 2020-03-08 NOTE — GENERAL PROGRESS NOTE
Assessment/Plan


Problem List:  


(1) Hematemesis


ICD Codes:  K92.0 - Hematemesis


SNOMED:  4999683


(2) Abnormal LFTs


ICD Codes:  R94.5 - Abnormal results of liver function studies


SNOMED:  738049422


(3) Chest pain


ICD Codes:  R07.9 - Chest pain, unspecified


SNOMED:  36464258


(4) Elevated troponin


ICD Codes:  R79.89 - Other specified abnormal findings of blood chemistry


SNOMED:  726509989, 794231984, 909356051


(5) Diverticulosis


ICD Codes:  K57.90 - Diverticulosis of intestine, part unspecified, without 

perforation or abscess without bleeding


SNOMED:  719281123


(6) Kidney calculi


ICD Codes:  N20.0 - Calculus of kidney


SNOMED:  28885364


(7) Fatty liver


ICD Codes:  K76.0 - Fatty (change of) liver, not elsewhere classified


SNOMED:  288070807


Status:  stable


Assessment/Plan:


no recurrent bleed post EGD


ppi to Q12


fu cbc


repeat lfts>>>improving


 carafate


pending cardiac stress test


dc ivf





Subjective


ROS Limited/Unobtainable:  Yes


Allergies:  


Coded Allergies:  


     No Known Allergies (Unverified , 3/2/20)





Objective





Last 24 Hour Vital Signs








  Date Time  Temp Pulse Resp B/P (MAP) Pulse Ox O2 Delivery O2 Flow Rate FiO2


 


3/8/20 04:06  91      


 


3/8/20 04:00 97.7 90 16 116/70 (85) 98   


 


3/8/20 00:00 98.1 91 16 118/69 (85) 98   


 


3/7/20 23:52  93      


 


3/7/20 21:00      Nasal Cannula 2.0 





      Nasal Cannula 2.0 


 


3/7/20 20:13  88      


 


3/7/20 20:00 99.7 94 18 101/71 (81) 99   


 


3/7/20 19:53  91      


 


3/7/20 16:00  88      


 


3/7/20 16:00 98.2 89 18 95/51 (66) 99   


 


3/7/20 12:00  94      


 


3/7/20 12:00 98.2 91 19 92/55 (67) 98   


 


3/7/20 09:13    144/62    


 


3/7/20 09:13  94  144/62    

















Intake and Output  


 


 3/7/20 3/8/20





 19:00 07:00


 


Intake Total 820 ml 1140.33 ml


 


Output Total 1000 ml 1 ml


 


Balance -180 ml 1139.33 ml


 


  


 


Intake Oral 720 ml 242 ml


 


IV Total 100 ml 898.33 ml


 


Output Urine Total 1000 ml 


 


Stool Total  1 ml


 


# Voids  4








Laboratory Tests


3/8/20 06:10: 


White Blood Count 5.2, Red Blood Count 2.57L, Hemoglobin 8.1L, Hematocrit 24.2L

, Mean Corpuscular Volume 94, Mean Corpuscular Hemoglobin 31.7H, Mean 

Corpuscular Hemoglobin Concent 33.7, Red Cell Distribution Width 13.2, Platelet 

Count 112L, Mean Platelet Volume 6.6, Neutrophils (%) (Auto) 52.2, Lymphocytes (

%) (Auto) 35.4, Monocytes (%) (Auto) 8.0, Eosinophils (%) (Auto) 3.6H, 

Basophils (%) (Auto) 0.7, Sodium Level 140, Potassium Level 3.5, Chloride Level 

104, Carbon Dioxide Level 31, Anion Gap 5, Blood Urea Nitrogen 11, Creatinine 

0.7, Estimat Glomerular Filtration Rate > 60, Glucose Level 118H, Calcium Level 

8.2L, Total Bilirubin 0.4, Aspartate Amino Transf (AST/SGOT) 42H, Alanine 

Aminotransferase (ALT/SGPT) 67, Alkaline Phosphatase 39L, Total Protein 5.9L, 

Albumin 2.6L, Globulin 3.3, Albumin/Globulin Ratio 0.8L


Height (Feet):  5


Height (Inches):  6.00


Weight (Pounds):  213


General Appearance:  alert


EENT:  normal ENT inspection


Neck:  supple


Cardiovascular:  normal rate


Respiratory/Chest:  decreased breath sounds


Abdomen:  normal bowel sounds, non tender, soft


Extremities:  non-tender











Elier Elizabeth MD Mar 8, 2020 08:43

## 2020-03-08 NOTE — CARDIOLOGY PROGRESS NOTE
Assessment/Plan


Status:  stable


Assessment/Plan


Assessment/Plan


Status:  stable


Assessment/Plan:


Assessment:


Chest pain


CHF acute on chronic


NSVT


Elevated troponin/ACS


Elevated LFT





Plan:


restart when cleared by GI :Aspirin/plavix


d/c heparin


Continue coreg/losartan - increase coreg dose


Start aldactone 25 mg daily


Spot dose lasix


Statin


Nitro prn


Stress test Monday


arrange for life vest given systolic dysfunction LVEF 30%


May need ICD if LVEF does not improve





Subjective


Cardiovascular:  Reports: no symptoms


Respiratory:  Reports: no symptoms


Gastrointestinal/Abdominal:  Reports: no symptoms


Genitourinary:  Reports: no symptoms


Subjective


NO acute events, H.H stable, vitals stable, no complaints, stress test for 

monday





Objective





Last 24 Hour Vital Signs








  Date Time  Temp Pulse Resp B/P (MAP) Pulse Ox O2 Delivery O2 Flow Rate FiO2


 


3/8/20 12:00 97.7 82 20 153/83 (106) 100   


 


3/8/20 12:00  87      


 


3/8/20 09:25    116/70    


 


3/8/20 09:25  91  116/70    


 


3/8/20 09:00      Room Air  


 


3/8/20 08:00  92      


 


3/8/20 08:00 98.1 88 18 151/87 (108) 98   


 


3/8/20 04:06  91      


 


3/8/20 04:00 97.7 90 16 116/70 (85) 98   


 


3/8/20 00:00 98.1 91 16 118/69 (85) 98   


 


3/7/20 23:52  93      


 


3/7/20 21:00      Nasal Cannula 2.0 





      Nasal Cannula 2.0 


 


3/7/20 20:13  88      


 


3/7/20 20:00 99.7 94 18 101/71 (81) 99   


 


3/7/20 19:53  91      








General Appearance:  no apparent distress, alert


EENT:  PERRL/EOMI, normal ENT inspection, TMs normal, pharynx normal


Neck:  non-tender, normal alignment, supple, normal inspection, no JVD


Rhythm:  NSR


Cardiovascular:  normal peripheral pulses, normal rate, regular rhythm


Respiratory/Chest:  chest wall non-tender, lungs clear, normal breath sounds, 

no respiratory distress


Abdomen:  normal bowel sounds, non tender, soft, no organomegaly, no mass


Extremities:  normal range of motion, non-tender, normal inspection, no calf 

tenderness, no swelling


Neurologic:  CNs II-XII grossly normal, no motor/sensory deficits











Intake and Output  


 


 3/7/20 3/8/20





 19:00 07:00


 


Intake Total 820 ml 1140.33 ml


 


Output Total 1000 ml 1 ml


 


Balance -180 ml 1139.33 ml


 


  


 


Intake Oral 720 ml 242 ml


 


IV Total 100 ml 898.33 ml


 


Output Urine Total 1000 ml 


 


Stool Total  1 ml


 


# Voids  4











Laboratory Tests








Test


  3/8/20


06:10


 


White Blood Count


  5.2 K/UL


(4.8-10.8)


 


Red Blood Count


  2.57 M/UL


(4.70-6.10)  L


 


Hemoglobin


  8.1 G/DL


(14.2-18.0)  L


 


Hematocrit


  24.2 %


(42.0-52.0)  L


 


Mean Corpuscular Volume 94 FL (80-99)  


 


Mean Corpuscular Hemoglobin


  31.7 PG


(27.0-31.0)  H


 


Mean Corpuscular Hemoglobin


Concent 33.7 G/DL


(32.0-36.0)


 


Red Cell Distribution Width


  13.2 %


(11.6-14.8)


 


Platelet Count


  112 K/UL


(150-450)  L


 


Mean Platelet Volume


  6.6 FL


(6.5-10.1)


 


Neutrophils (%) (Auto)


  52.2 %


(45.0-75.0)


 


Lymphocytes (%) (Auto)


  35.4 %


(20.0-45.0)


 


Monocytes (%) (Auto)


  8.0 %


(1.0-10.0)


 


Eosinophils (%) (Auto)


  3.6 %


(0.0-3.0)  H


 


Basophils (%) (Auto)


  0.7 %


(0.0-2.0)


 


Sodium Level


  140 MMOL/L


(136-145)


 


Potassium Level


  3.5 MMOL/L


(3.5-5.1)


 


Chloride Level


  104 MMOL/L


()


 


Carbon Dioxide Level


  31 MMOL/L


(21-32)


 


Anion Gap


  5 mmol/L


(5-15)


 


Blood Urea Nitrogen


  11 mg/dL


(7-18)


 


Creatinine


  0.7 MG/DL


(0.55-1.30)


 


Estimat Glomerular Filtration


Rate > 60 mL/min


(>60)


 


Glucose Level


  118 MG/DL


()  H


 


Calcium Level


  8.2 MG/DL


(8.5-10.1)  L


 


Total Bilirubin


  0.4 MG/DL


(0.2-1.0)


 


Aspartate Amino Transf


(AST/SGOT) 42 U/L (15-37)


H


 


Alanine Aminotransferase


(ALT/SGPT) 67 U/L (12-78)


 


 


Alkaline Phosphatase


  39 U/L


()  L


 


Total Protein


  5.9 G/DL


(6.4-8.2)  L


 


Albumin


  2.6 G/DL


(3.4-5.0)  L


 


Globulin 3.3 g/dL  


 


Albumin/Globulin Ratio


  0.8 (1.0-2.7)


L

















Ean Marin MD Mar 8, 2020 19:05

## 2020-03-08 NOTE — GENERAL PROGRESS NOTE
Assessment/Plan


Status:  stable


Assessment/Plan:


Mr. Yarbrough is a 65 year old male with hx of systolic CHF, HTN, presenting with 

acute onset chest pain, found to have transaminitis. 





#Chest pain


#Elevated troponins/ACS


#HTN


#Acute on Chronic systolic CHF, HFrEF 30% 


#NSVT


-s/p heparin gtt x 48 hrs (3/3-3/5) 


-TTE with EF 30%. severe LV dysfunction. 


-Continue coreg 6.25 BID, losartan 25 daily 


-d/c Lasix for hematemesis (see below). can restart later as needed. 


-Cardiology following: restart ASA/Plavix when cleared by GI, plan for stress 

test monday


NPO past MN





#Hematemesis


#GIB


#acute blood loss anemia


-no signs of bleeding, cont. to monitor


-transfuse for Hgb <7


-s/p EGD and epi injection by GI (3/5)


-s/p protonix gtt


-cont. PPi BID


-start feeding. 


-PT ordered for weakness (3/6)


-GI following: Carafate





#Transaminitis - downtrending


#Abdominal pain


-CT A/P unremarkable.


-GI following: PPI, carafate





Time spent on encounter: 35 minutes, >50% time spent on pt counseling and 

coordination of care. Discussed case w/GI.





Time of note doesn't reflect time of encounter.





Subjective


Allergies:  


Coded Allergies:  


     No Known Allergies (Unverified , 3/2/20)


Subjective


Pt states he feels well today, denies any CP, N/V, SOB, abd pain, dysuria or 

constipation at this time.





Objective





Last 24 Hour Vital Signs








  Date Time  Temp Pulse Resp B/P (MAP) Pulse Ox O2 Delivery O2 Flow Rate FiO2


 


3/8/20 12:00 97.7 82 20 153/83 (106) 100   


 


3/8/20 09:25    116/70    


 


3/8/20 09:25  91  116/70    


 


3/8/20 08:00  92      


 


3/8/20 08:00 98.1 88 18 151/87 (108) 98   


 


3/8/20 04:06  91      


 


3/8/20 04:00 97.7 90 16 116/70 (85) 98   


 


3/8/20 00:00 98.1 91 16 118/69 (85) 98   


 


3/7/20 23:52  93      


 


3/7/20 21:00      Nasal Cannula 2.0 





      Nasal Cannula 2.0 


 


3/7/20 20:13  88      


 


3/7/20 20:00 99.7 94 18 101/71 (81) 99   


 


3/7/20 19:53  91      


 


3/7/20 16:00  88      


 


3/7/20 16:00 98.2 89 18 95/51 (66) 99   

















Intake and Output  


 


 3/7/20 3/8/20





 19:00 07:00


 


Intake Total 820 ml 1140.33 ml


 


Output Total 1000 ml 1 ml


 


Balance -180 ml 1139.33 ml


 


  


 


Intake Oral 720 ml 242 ml


 


IV Total 100 ml 898.33 ml


 


Output Urine Total 1000 ml 


 


Stool Total  1 ml


 


# Voids  4








Laboratory Tests


3/8/20 06:10: 


White Blood Count 5.2, Red Blood Count 2.57L, Hemoglobin 8.1L, Hematocrit 24.2L

, Mean Corpuscular Volume 94, Mean Corpuscular Hemoglobin 31.7H, Mean 

Corpuscular Hemoglobin Concent 33.7, Red Cell Distribution Width 13.2, Platelet 

Count 112L, Mean Platelet Volume 6.6, Neutrophils (%) (Auto) 52.2, Lymphocytes (

%) (Auto) 35.4, Monocytes (%) (Auto) 8.0, Eosinophils (%) (Auto) 3.6H, 

Basophils (%) (Auto) 0.7, Sodium Level 140, Potassium Level 3.5, Chloride Level 

104, Carbon Dioxide Level 31, Anion Gap 5, Blood Urea Nitrogen 11, Creatinine 

0.7, Estimat Glomerular Filtration Rate > 60, Glucose Level 118H, Calcium Level 

8.2L, Total Bilirubin 0.4, Aspartate Amino Transf (AST/SGOT) 42H, Alanine 

Aminotransferase (ALT/SGPT) 67, Alkaline Phosphatase 39L, Total Protein 5.9L, 

Albumin 2.6L, Globulin 3.3, Albumin/Globulin Ratio 0.8L


Height (Feet):  5


Height (Inches):  6.00


Weight (Pounds):  213


Objective


General Appearance:  no apparent distress, alert


Neck:  supple


Cardiovascular:  normal rate, regular rhythm


Respiratory/Chest:  lungs clear, normal breath sounds


Abdomen:  non tender, soft


Ext: no edema











Hernan Valladares M.D. Mar 8, 2020 12:54

## 2020-03-09 VITALS — SYSTOLIC BLOOD PRESSURE: 126 MMHG | DIASTOLIC BLOOD PRESSURE: 69 MMHG

## 2020-03-09 VITALS — DIASTOLIC BLOOD PRESSURE: 71 MMHG | SYSTOLIC BLOOD PRESSURE: 101 MMHG

## 2020-03-09 VITALS — DIASTOLIC BLOOD PRESSURE: 71 MMHG | SYSTOLIC BLOOD PRESSURE: 123 MMHG

## 2020-03-09 VITALS — SYSTOLIC BLOOD PRESSURE: 96 MMHG | DIASTOLIC BLOOD PRESSURE: 58 MMHG

## 2020-03-09 VITALS — SYSTOLIC BLOOD PRESSURE: 117 MMHG | DIASTOLIC BLOOD PRESSURE: 75 MMHG

## 2020-03-09 VITALS — SYSTOLIC BLOOD PRESSURE: 130 MMHG | DIASTOLIC BLOOD PRESSURE: 87 MMHG

## 2020-03-09 LAB
ADD MANUAL DIFF: NO
ALBUMIN SERPL-MCNC: 2.8 G/DL (ref 3.4–5)
ALBUMIN/GLOB SERPL: 0.8 {RATIO} (ref 1–2.7)
ALP SERPL-CCNC: 42 U/L (ref 46–116)
ALT SERPL-CCNC: 66 U/L (ref 12–78)
ANION GAP SERPL CALC-SCNC: 7 MMOL/L (ref 5–15)
AST SERPL-CCNC: 45 U/L (ref 15–37)
BASOPHILS NFR BLD AUTO: 0.9 % (ref 0–2)
BILIRUB SERPL-MCNC: 0.5 MG/DL (ref 0.2–1)
BUN SERPL-MCNC: 6 MG/DL (ref 7–18)
CALCIUM SERPL-MCNC: 8.6 MG/DL (ref 8.5–10.1)
CHLORIDE SERPL-SCNC: 104 MMOL/L (ref 98–107)
CO2 SERPL-SCNC: 29 MMOL/L (ref 21–32)
CREAT SERPL-MCNC: 0.6 MG/DL (ref 0.55–1.3)
EOSINOPHIL NFR BLD AUTO: 3 % (ref 0–3)
ERYTHROCYTE [DISTWIDTH] IN BLOOD BY AUTOMATED COUNT: 13.6 % (ref 11.6–14.8)
GLOBULIN SER-MCNC: 3.5 G/DL
HCT VFR BLD CALC: 24.4 % (ref 42–52)
HGB BLD-MCNC: 8.3 G/DL (ref 14.2–18)
LYMPHOCYTES NFR BLD AUTO: 30.4 % (ref 20–45)
MCV RBC AUTO: 93 FL (ref 80–99)
MONOCYTES NFR BLD AUTO: 8.8 % (ref 1–10)
NEUTROPHILS NFR BLD AUTO: 56.9 % (ref 45–75)
PLATELET # BLD: 141 K/UL (ref 150–450)
POTASSIUM SERPL-SCNC: 3.4 MMOL/L (ref 3.5–5.1)
RBC # BLD AUTO: 2.62 M/UL (ref 4.7–6.1)
SODIUM SERPL-SCNC: 140 MMOL/L (ref 136–145)
WBC # BLD AUTO: 5 K/UL (ref 4.8–10.8)

## 2020-03-09 RX ADMIN — PANTOPRAZOLE SODIUM SCH MG: 40 INJECTION, POWDER, FOR SOLUTION INTRAVENOUS at 09:01

## 2020-03-09 RX ADMIN — LOSARTAN POTASSIUM SCH MG: 25 TABLET, FILM COATED ORAL at 08:59

## 2020-03-09 RX ADMIN — SODIUM CHLORIDE SCH MG: 0.9 INJECTION INTRAVENOUS at 20:31

## 2020-03-09 RX ADMIN — SUCRALFATE SCH GM: 1 TABLET ORAL at 13:00

## 2020-03-09 RX ADMIN — SODIUM CHLORIDE SCH MG: 0.9 INJECTION INTRAVENOUS at 09:01

## 2020-03-09 RX ADMIN — SPIRONOLACTONE SCH MG: 25 TABLET, FILM COATED ORAL at 09:01

## 2020-03-09 RX ADMIN — DOCUSATE SODIUM SCH MG: 100 CAPSULE, LIQUID FILLED ORAL at 09:00

## 2020-03-09 RX ADMIN — DOCUSATE SODIUM SCH MG: 100 CAPSULE, LIQUID FILLED ORAL at 20:29

## 2020-03-09 RX ADMIN — SUCRALFATE SCH GM: 1 TABLET ORAL at 19:00

## 2020-03-09 RX ADMIN — PANTOPRAZOLE SODIUM SCH MG: 40 INJECTION, POWDER, FOR SOLUTION INTRAVENOUS at 20:30

## 2020-03-09 RX ADMIN — CARVEDILOL SCH MG: 6.25 TABLET, FILM COATED ORAL at 09:00

## 2020-03-09 RX ADMIN — SODIUM CHLORIDE SCH MG: 0.9 INJECTION INTRAVENOUS at 04:03

## 2020-03-09 RX ADMIN — SUCRALFATE SCH GM: 1 TABLET ORAL at 09:00

## 2020-03-09 RX ADMIN — SUCRALFATE SCH GM: 1 TABLET ORAL at 20:29

## 2020-03-09 RX ADMIN — SODIUM CHLORIDE SCH MG: 0.9 INJECTION INTRAVENOUS at 15:00

## 2020-03-09 NOTE — GI PROGRESS NOTE
Assessment/Plan


Problems:  


(1) Elevated troponin


ICD Codes:  R79.89 - Other specified abnormal findings of blood chemistry


SNOMED:  700768252, 808861723, 269929396


(2) Chest pain


ICD Codes:  R07.9 - Chest pain, unspecified


SNOMED:  62155796


(3) Abnormal LFTs


ICD Codes:  R94.5 - Abnormal results of liver function studies


SNOMED:  398263864


Status:  stable


Status Narrative


Discussed with Dr. Elizabeth.


Assessment/Plan


APCT reviewed


hepatitis panel negative





no recurrent bleed post EGD


ppi to Q12


fu cbc


repeat lfts>>>improving


carafate


pending cardiac stress test


dc ivf





The patient was seen and examined at bedside and all new and available data was 

reviewed in the patients chart. I agree with the above findings, impression 

and plan.  (Patient seen earlier today. Signature stamp does not reflect 

patient encounter time.). - Elier Elizabeth MD





Subjective


Gastrointestinal/Abdominal:  Reports: no symptoms





Objective





Last 24 Hour Vital Signs








  Date Time  Temp Pulse Resp B/P (MAP) Pulse Ox O2 Delivery O2 Flow Rate FiO2


 


3/9/20 09:00  87  130/87    


 


3/9/20 08:59    130/87    


 


3/9/20 07:56  87      


 


3/9/20 07:55 97.2 88 20 130/87 (101) 100   


 


3/9/20 04:00  86      


 


3/9/20 04:00 98.2 91 20 96/58 (71) 99   


 


3/9/20 00:00 98.8 91 20 123/71 (88) 98   


 


3/9/20 00:00  96      


 


3/8/20 21:00      Room Air  


 


3/8/20 20:00 98.2 91 18 142/87 (105) 96   


 


3/8/20 20:00  90      


 


3/8/20 16:00 98.3 88 16 142/71 (94) 98   


 


3/8/20 16:00  88      


 


3/8/20 12:00 97.7 82 20 153/83 (106) 100   


 


3/8/20 12:00  87      

















Intake and Output  


 


 3/8/20 3/9/20





 19:00 07:00


 


Intake Total 720 ml 525 ml


 


Output Total 900 ml 400 ml


 


Balance -180 ml 125 ml


 


  


 


Intake Oral 720 ml 


 


IV Total  525 ml


 


Output Urine Total 900 ml 400 ml











Laboratory Tests








Test


  3/9/20


06:32


 


White Blood Count


  5.0 K/UL


(4.8-10.8)


 


Red Blood Count


  2.62 M/UL


(4.70-6.10)  L


 


Hemoglobin


  8.3 G/DL


(14.2-18.0)  L


 


Hematocrit


  24.4 %


(42.0-52.0)  L


 


Mean Corpuscular Volume 93 FL (80-99)  


 


Mean Corpuscular Hemoglobin


  31.6 PG


(27.0-31.0)  H


 


Mean Corpuscular Hemoglobin


Concent 34.0 G/DL


(32.0-36.0)


 


Red Cell Distribution Width


  13.6 %


(11.6-14.8)


 


Platelet Count


  141 K/UL


(150-450)  L


 


Mean Platelet Volume


  6.6 FL


(6.5-10.1)


 


Neutrophils (%) (Auto)


  56.9 %


(45.0-75.0)


 


Lymphocytes (%) (Auto)


  30.4 %


(20.0-45.0)


 


Monocytes (%) (Auto)


  8.8 %


(1.0-10.0)


 


Eosinophils (%) (Auto)


  3.0 %


(0.0-3.0)


 


Basophils (%) (Auto)


  0.9 %


(0.0-2.0)


 


Sodium Level


  140 MMOL/L


(136-145)


 


Potassium Level


  3.4 MMOL/L


(3.5-5.1)  L


 


Chloride Level


  104 MMOL/L


()


 


Carbon Dioxide Level


  29 MMOL/L


(21-32)


 


Anion Gap


  7 mmol/L


(5-15)


 


Blood Urea Nitrogen


  6 mg/dL (7-18)


L


 


Creatinine


  0.6 MG/DL


(0.55-1.30)


 


Estimat Glomerular Filtration


Rate > 60 mL/min


(>60)


 


Glucose Level


  108 MG/DL


()  H


 


Calcium Level


  8.6 MG/DL


(8.5-10.1)


 


Total Bilirubin


  0.5 MG/DL


(0.2-1.0)


 


Aspartate Amino Transf


(AST/SGOT) 45 U/L (15-37)


H


 


Alanine Aminotransferase


(ALT/SGPT) 66 U/L (12-78)


 


 


Alkaline Phosphatase


  42 U/L


()  L


 


Total Protein


  6.3 G/DL


(6.4-8.2)  L


 


Albumin


  2.8 G/DL


(3.4-5.0)  L


 


Globulin 3.5 g/dL  


 


Albumin/Globulin Ratio


  0.8 (1.0-2.7)


L








Height (Feet):  5


Height (Inches):  6.00


Weight (Pounds):  215


General Appearance:  WD/WN, no apparent distress, alert


Cardiovascular:  normal rate


Respiratory/Chest:  normal breath sounds, no respiratory distress


Abdominal Exam:  normal bowel sounds, non tender, soft


Extremities:  normal range of motion, non-tender











Daniel Patel NP Mar 9, 2020 11:16

## 2020-03-09 NOTE — DIAGNOSTIC IMAGING REPORT
Indication:  chest pain

 

Technique: The study was conducted under the supervision of a cardiologist. lexiscan

(regadenoson) infusion over 10 seconds followed by intravenous administration of 30.6

mCi of technetium 99m Myoview was performed. Three plane SPECT imaging of the heart

was then performed. A resting study was performed as part of the one-day protocol

with 10.1 mCi of technetium 99m myoview injected intravenously at that time. Three

plane SPECT imaging of the heart was obtained.

 

Comparison: None

 

Clinical data:

 

1. Clinical response:  Non ischemic

2. Electrocardiographic response: Non ischemic

 

Findings: 

 

The myocardial perfusion scan demonstrates LVEF of 30%. LV dilatation demonstrated.

Heterogeneous perfusion noted without any definite areas of reversibility or

ischemia.

 

IMPRESSION:

 

LV dilatation. No definite evidence of myocardial ischemia.

 

LVEF estimated at 30%

 

Note: Estimation of LVEF on this examination is likely inaccurate. In our experience,

the calculated LVEF is usually overestimated on this software program.

## 2020-03-09 NOTE — CARDIOLOGY PROGRESS NOTE
Assessment/Plan


Status:  stable


Assessment/Plan


Assessment/Plan


Status:  stable


Assessment/Plan:


Assessment:


Chest pain


CHF acute on chronic


NSVT


Elevated troponin/ACS


Elevated LFT





Plan:


restart when cleared by GI :Aspirin/plavix


d/c heparin


Continue coreg/losartan - increased coreg dose 12.5 mg bid


Start aldactone 25 mg daily


Spot dose lasix prn


Statin


Nitro prn


Stress test Monday


arrange for life vest given systolic dysfunction LVEF 30%


May need ICD if LVEF does not improve





Subjective


Cardiovascular:  Reports: no symptoms


Respiratory:  Reports: no symptoms


Gastrointestinal/Abdominal:  Reports: no symptoms


Genitourinary:  Reports: no symptoms


Subjective


NO acute events, H.H stable, vitals stable, no complaints, stress test for 

monday





Objective





Last 24 Hour Vital Signs








  Date Time  Temp Pulse Resp B/P (MAP) Pulse Ox O2 Delivery O2 Flow Rate FiO2


 


3/9/20 09:00  87  130/87    


 


3/9/20 08:59    130/87    


 


3/9/20 07:56  87      


 


3/9/20 07:55 97.2 88 20 130/87 (101) 100   


 


3/9/20 04:00  86      


 


3/9/20 04:00 98.2 91 20 96/58 (71) 99   


 


3/9/20 00:00 98.8 91 20 123/71 (88) 98   


 


3/9/20 00:00  96      


 


3/8/20 21:00      Room Air  


 


3/8/20 20:00 98.2 91 18 142/87 (105) 96   


 


3/8/20 20:00  90      


 


3/8/20 16:00 98.3 88 16 142/71 (94) 98   


 


3/8/20 16:00  88      


 


3/8/20 12:00 97.7 82 20 153/83 (106) 100   


 


3/8/20 12:00  87      








General Appearance:  no apparent distress, alert


EENT:  PERRL/EOMI, normal ENT inspection, TMs normal, pharynx normal


Neck:  non-tender, normal alignment, supple, normal inspection, no JVD


Rhythm:  NSR


Cardiovascular:  normal peripheral pulses, normal rate, regular rhythm


Respiratory/Chest:  chest wall non-tender, lungs clear, normal breath sounds, 

no respiratory distress, no accessory muscle use, respiratory distress


Abdomen:  normal bowel sounds, non tender, soft, no organomegaly, no mass


Extremities:  normal range of motion, non-tender, normal inspection, no calf 

tenderness, no swelling


Neurologic:  CNs II-XII grossly normal, no motor/sensory deficits











Intake and Output  


 


 3/8/20 3/9/20





 18:59 06:59


 


Intake Total 720 ml 450 ml


 


Output Total 900 ml 400 ml


 


Balance -180 ml 50 ml


 


  


 


Intake Oral 720 ml 


 


IV Total  450 ml


 


Output Urine Total 900 ml 400 ml











Laboratory Tests








Test


  3/9/20


06:32


 


White Blood Count


  5.0 K/UL


(4.8-10.8)


 


Red Blood Count


  2.62 M/UL


(4.70-6.10)  L


 


Hemoglobin


  8.3 G/DL


(14.2-18.0)  L


 


Hematocrit


  24.4 %


(42.0-52.0)  L


 


Mean Corpuscular Volume 93 FL (80-99)  


 


Mean Corpuscular Hemoglobin


  31.6 PG


(27.0-31.0)  H


 


Mean Corpuscular Hemoglobin


Concent 34.0 G/DL


(32.0-36.0)


 


Red Cell Distribution Width


  13.6 %


(11.6-14.8)


 


Platelet Count


  141 K/UL


(150-450)  L


 


Mean Platelet Volume


  6.6 FL


(6.5-10.1)


 


Neutrophils (%) (Auto)


  56.9 %


(45.0-75.0)


 


Lymphocytes (%) (Auto)


  30.4 %


(20.0-45.0)


 


Monocytes (%) (Auto)


  8.8 %


(1.0-10.0)


 


Eosinophils (%) (Auto)


  3.0 %


(0.0-3.0)


 


Basophils (%) (Auto)


  0.9 %


(0.0-2.0)


 


Sodium Level


  140 MMOL/L


(136-145)


 


Potassium Level


  3.4 MMOL/L


(3.5-5.1)  L


 


Chloride Level


  104 MMOL/L


()


 


Carbon Dioxide Level


  29 MMOL/L


(21-32)


 


Anion Gap


  7 mmol/L


(5-15)


 


Blood Urea Nitrogen


  6 mg/dL (7-18)


L


 


Creatinine


  0.6 MG/DL


(0.55-1.30)


 


Estimat Glomerular Filtration


Rate > 60 mL/min


(>60)


 


Glucose Level


  108 MG/DL


()  H


 


Calcium Level


  8.6 MG/DL


(8.5-10.1)


 


Total Bilirubin


  0.5 MG/DL


(0.2-1.0)


 


Aspartate Amino Transf


(AST/SGOT) 45 U/L (15-37)


H


 


Alanine Aminotransferase


(ALT/SGPT) 66 U/L (12-78)


 


 


Alkaline Phosphatase


  42 U/L


()  L


 


Total Protein


  6.3 G/DL


(6.4-8.2)  L


 


Albumin


  2.8 G/DL


(3.4-5.0)  L


 


Globulin 3.5 g/dL  


 


Albumin/Globulin Ratio


  0.8 (1.0-2.7)


L

















Ean Marin MD Mar 9, 2020 10:04

## 2020-03-09 NOTE — GENERAL PROGRESS NOTE
Assessment/Plan


Status:  stable


Assessment/Plan:


Mr. Yarbrough is a 65 year old male with hx of systolic chronic sCHF, HTN, 

presenting with acute onset chest pain, found to have transaminitis. 





#Chest pain


#ACS


#HTN


#Acute on Chronic systolic CHF, HFrEF 30% 


#NSVT


-s/p heparin gtt x 48 hrs (3/3-3/5) 


-TTE with EF 30%. severe LV dysfunction. 


-Continue coreg 6.25 BID, losartan 25 daily 


-Off Lasix for now


-Cardiology following: restart ASA/Plavix when cleared by GI


-Stress test today





#Hematemesis


#GIB


#acute blood loss anemia


-transfuse for Hgb <7


-s/p EGD and epi injection by GI (3/5)


-s/p protonix gtt


-cont. PPI BID


-start feeding. 


-PT ordered for weakness (3/6)


-GI following: Carafate





#Transaminitis - downtrending


#Abdominal pain


-CT A/P unremarkable.


-GI following: PPI, carafate





Time spent on encounter: 35 minutes, >50% time spent on pt counseling and 

coordination of care. 








Time of note doesn't reflect time of encounter.





Subjective


Date patient seen:  Mar 9, 2020


Time patient seen:  09:00


ROS Limited/Unobtainable:  No


Constitutional:  Denies: chills, fever


Cardiovascular:  Denies: chest pain, irregular heart rate


Respiratory:  Denies: cough, shortness of breath


Neurologic/Psychiatric:  Denies: anxiety


Endocrine:  Denies: excessive sweating


Hematologic/Lymphatic:  Denies: anemia


Allergies:  


Coded Allergies:  


     No Known Allergies (Unverified , 3/2/20)


Subjective


Follow up for acute blood loss anemia, acute on chronic systolic CHF


Feeling well today, no new complaints. Going for stress testing today.





Objective





Last 24 Hour Vital Signs








  Date Time  Temp Pulse Resp B/P (MAP) Pulse Ox O2 Delivery O2 Flow Rate FiO2


 


3/9/20 14:03      Room Air  


 


3/9/20 12:00  83      


 


3/9/20 12:00 97.9 91 20 126/69 (88) 100   


 


3/9/20 09:00      Room Air  


 


3/9/20 09:00  87  130/87    


 


3/9/20 08:59    130/87    


 


3/9/20 07:56  87      


 


3/9/20 07:55 97.2 88 20 130/87 (101) 100   


 


3/9/20 04:00  86      


 


3/9/20 04:00 98.2 91 20 96/58 (71) 99   


 


3/9/20 00:00 98.8 91 20 123/71 (88) 98   


 


3/9/20 00:00  96      


 


3/8/20 21:00      Room Air  


 


3/8/20 20:00 98.2 91 18 142/87 (105) 96   


 


3/8/20 20:00  90      


 


3/8/20 16:00 98.3 88 16 142/71 (94) 98   


 


3/8/20 16:00  88      

















Intake and Output  


 


 3/8/20 3/9/20





 19:00 07:00


 


Intake Total 720 ml 525 ml


 


Output Total 900 ml 400 ml


 


Balance -180 ml 125 ml


 


  


 


Intake Oral 720 ml 


 


IV Total  525 ml


 


Output Urine Total 900 ml 400 ml








Laboratory Tests


3/9/20 06:32: 


White Blood Count 5.0, Red Blood Count 2.62L, Hemoglobin 8.3L, Hematocrit 24.4L

, Mean Corpuscular Volume 93, Mean Corpuscular Hemoglobin 31.6H, Mean 

Corpuscular Hemoglobin Concent 34.0, Red Cell Distribution Width 13.6, Platelet 

Count 141L, Mean Platelet Volume 6.6, Neutrophils (%) (Auto) 56.9, Lymphocytes (

%) (Auto) 30.4, Monocytes (%) (Auto) 8.8, Eosinophils (%) (Auto) 3.0, Basophils 

(%) (Auto) 0.9, Sodium Level 140, Potassium Level 3.4L, Chloride Level 104, 

Carbon Dioxide Level 29, Anion Gap 7, Blood Urea Nitrogen 6L, Creatinine 0.6, 

Estimat Glomerular Filtration Rate > 60, Glucose Level 108H, Calcium Level 8.6, 

Total Bilirubin 0.5, Aspartate Amino Transf (AST/SGOT) 45H, Alanine 

Aminotransferase (ALT/SGPT) 66, Alkaline Phosphatase 42L, Total Protein 6.3L, 

Albumin 2.8L, Globulin 3.5, Albumin/Globulin Ratio 0.8L


Height (Feet):  5


Height (Inches):  6.00


Weight (Pounds):  215


General Appearance:  no apparent distress, alert


Neck:  supple


Cardiovascular:  normal rate, regular rhythm


Respiratory/Chest:  lungs clear, normal breath sounds


Abdomen:  non tender, soft


Edema:  mild edema











Aaron Alcazar MD Mar 9, 2020 14:30

## 2020-03-10 VITALS — DIASTOLIC BLOOD PRESSURE: 72 MMHG | SYSTOLIC BLOOD PRESSURE: 118 MMHG

## 2020-03-10 VITALS — DIASTOLIC BLOOD PRESSURE: 76 MMHG | SYSTOLIC BLOOD PRESSURE: 120 MMHG

## 2020-03-10 VITALS — DIASTOLIC BLOOD PRESSURE: 73 MMHG | SYSTOLIC BLOOD PRESSURE: 114 MMHG

## 2020-03-10 VITALS — DIASTOLIC BLOOD PRESSURE: 71 MMHG | SYSTOLIC BLOOD PRESSURE: 101 MMHG

## 2020-03-10 LAB
ADD MANUAL DIFF: NO
ALBUMIN SERPL-MCNC: 2.8 G/DL (ref 3.4–5)
ALBUMIN/GLOB SERPL: 0.8 {RATIO} (ref 1–2.7)
ALP SERPL-CCNC: 46 U/L (ref 46–116)
ALT SERPL-CCNC: 64 U/L (ref 12–78)
ANION GAP SERPL CALC-SCNC: 10 MMOL/L (ref 5–15)
AST SERPL-CCNC: 39 U/L (ref 15–37)
BASOPHILS NFR BLD AUTO: 0.8 % (ref 0–2)
BILIRUB SERPL-MCNC: 0.5 MG/DL (ref 0.2–1)
BUN SERPL-MCNC: 11 MG/DL (ref 7–18)
CALCIUM SERPL-MCNC: 8.6 MG/DL (ref 8.5–10.1)
CHLORIDE SERPL-SCNC: 105 MMOL/L (ref 98–107)
CO2 SERPL-SCNC: 25 MMOL/L (ref 21–32)
CREAT SERPL-MCNC: 0.7 MG/DL (ref 0.55–1.3)
EOSINOPHIL NFR BLD AUTO: 3.4 % (ref 0–3)
ERYTHROCYTE [DISTWIDTH] IN BLOOD BY AUTOMATED COUNT: 13.5 % (ref 11.6–14.8)
GLOBULIN SER-MCNC: 3.5 G/DL
HCT VFR BLD CALC: 25.3 % (ref 42–52)
HGB BLD-MCNC: 8.3 G/DL (ref 14.2–18)
LYMPHOCYTES NFR BLD AUTO: 28.9 % (ref 20–45)
MCV RBC AUTO: 93 FL (ref 80–99)
MONOCYTES NFR BLD AUTO: 11.9 % (ref 1–10)
NEUTROPHILS NFR BLD AUTO: 54.9 % (ref 45–75)
PHOSPHATE SERPL-MCNC: 4.1 MG/DL (ref 2.5–4.9)
PLATELET # BLD: 165 K/UL (ref 150–450)
POTASSIUM SERPL-SCNC: 3.7 MMOL/L (ref 3.5–5.1)
RBC # BLD AUTO: 2.71 M/UL (ref 4.7–6.1)
SODIUM SERPL-SCNC: 140 MMOL/L (ref 136–145)
WBC # BLD AUTO: 5.1 K/UL (ref 4.8–10.8)

## 2020-03-10 RX ADMIN — DOCUSATE SODIUM SCH MG: 100 CAPSULE, LIQUID FILLED ORAL at 09:00

## 2020-03-10 RX ADMIN — SODIUM CHLORIDE SCH MG: 0.9 INJECTION INTRAVENOUS at 03:33

## 2020-03-10 RX ADMIN — LOSARTAN POTASSIUM SCH MG: 25 TABLET, FILM COATED ORAL at 09:00

## 2020-03-10 RX ADMIN — SODIUM CHLORIDE SCH MG: 0.9 INJECTION INTRAVENOUS at 15:00

## 2020-03-10 RX ADMIN — CARVEDILOL SCH MG: 6.25 TABLET, FILM COATED ORAL at 09:00

## 2020-03-10 RX ADMIN — SUCRALFATE SCH GM: 1 TABLET ORAL at 09:00

## 2020-03-10 RX ADMIN — SODIUM CHLORIDE SCH MG: 0.9 INJECTION INTRAVENOUS at 09:00

## 2020-03-10 RX ADMIN — SUCRALFATE SCH GM: 1 TABLET ORAL at 13:00

## 2020-03-10 RX ADMIN — SPIRONOLACTONE SCH MG: 25 TABLET, FILM COATED ORAL at 09:00

## 2020-03-10 RX ADMIN — PANTOPRAZOLE SODIUM SCH MG: 40 INJECTION, POWDER, FOR SOLUTION INTRAVENOUS at 08:59

## 2020-03-10 NOTE — CONSULTATION
DATE OF CONSULTATION:  03/10/2020

CARDIAC ELECTROPHYSIOLOGY CONSULTATION



CONSULTING PHYSICIAN:  Jono De Dios M.D.



REFERRING PHYSICIAN:  Ean Marin M.D.



REASON FOR CONSULTATION:  Evaluation of the patient's pacemaker versus

defibrillator.



HISTORY OF PRESENT ILLNESS:  The patient is a 67-year-old gentleman with

history of hypertension and cardiomyopathy, who undergone a pacemaker

versus defibrillator implantation at [facility] in January.

The patient was evaluated by Dr. Marin for elevated troponin.  He also

has chest pain, did not improve.  He is on aspirin and nitroglycerin.  The

patient also had episode of nonsustained ventricular tachycardia.  His

echocardiogram showed EF of 30%.  The patient also had hematemesis and had

abnormal liver function test, and underwent EGD by Dr. Elizabeth that showed

no recurrent bleed and stress test by Dr. Marin as well.



REVIEW OF SYSTEMS:  Review of systems was negative other than what was

mentioned in the history of present illness.



PAST MEDICAL HISTORY:  As mentioned above.



FAMILY HISTORY:  Noncontributory.



SOCIAL HISTORY:  Does not smoke or drink alcohol.



PHYSICAL EXAMINATION:

VITAL SIGNS:  Show blood pressure is 110/70, pulse 95, respirations _____,

and temperature 97.9.

HEAD AND NECK:  Shows no JVD.

LUNGS:  Clear.

CARDIOVASCULAR:  Shows regular S1 and S2.  Defibrillator incision in the

left subclavian.

ABDOMEN:  Soft.

EXTREMITIES:  No pitting edema.



LABORATORY AND DIAGNOSTIC DATA:  White count of 5.1, hemoglobin 8.7,

hematocrit 25.3, and platelet of 165,000.  Sodium 140, potassium 3.7, BUN

of 11, creatinine 0.7, and glucose of 100.  Troponin is initially positive

with subsequent was negative.



ASSESSMENT AND PLAN:

1. Status post single-chamber defibrillator implantation based on chest

x-ray and imaging.  The patient does not need a LifeVest.  We will try to

find the brand of the defibrillator and interrogate that after further

evaluation.

2. Congestive heart failure, EF of 30%.  The patient is already on Coreg

12.5 mg, Aldactone 25 mg daily, Cozaar 25 mg daily.  _____ and increase

the Coreg.  Further evaluation by Dr. Marin.

3. Cardiomyopathy, ischemic versus nonischemic.  The patient had cardiac

catheterization at [facility], we will try to get those

records.

4. Status post GI bleed.

5. Nonsustained ventricular tachycardia.



Thank you very much for allowing me to participate in the care of this

patient.  Please do not hesitate to contact me for any questions regarding

my evaluation.









  ______________________________________________

  Jono De Dios M.D.





DR:  SABINA

D:  03/10/2020 10:46

T:  03/10/2020 17:45

JOB#:  [id number]

CC:

## 2020-03-10 NOTE — DISCHARGE SUMMARY
Discharge Summary


Hospital Course


Date of Admission


Mar 2, 2020 at 18:30


Date of Discharge


3/10/20


Admitting Diagnosis


chest pain, acs


HPI


Brad Yarbrough is a 65 year old male who was admitted on Mar 2, 2020 at 18:30 for 

Chest Pain,Acute Coronary Syndrome


Consultations


Cardiology


GI


Hospital Course


Mr. Yarbrough is a 65 year old male with hx of systolic chronic sCHF, HTN, 

presenting with acute onset chest pain, found to have transaminitis. 


Admitted to the medical service with ACS, treated with heparin drip as per 

cardiology recs


Patient developed acute blood loss anemia, UGI bleed, underwent EGD by Dr. Elizabeth, s/p epi injection by GI, will continue PPI and Carafate as outpatient


Patient underwent cardiac stress testing that was negative, noted to have ICD 

so life vest not necessary, cleared by Cardiology for discharge home





Discharge Diagnoses:


#Chest pain


#ACS


#HTN


#Acute on Chronic systolic CHF, HFrEF 30% 


#NSVT


#Hematemesis


#GIB


#acute blood loss anemia


#Transaminitis - downtrending


#Abdominal pain








Time spent on preparing discharge was 35 minutes, >50% time spent on pt 

counseling and coordination of care. 








Time of note doesn't reflect time of encounter.





Discharge Medications


New Medications:  


Pantoprazole* (Protonix*) 40 Mg Tablet.dr


40 MG ORAL DAILY for 30 Days, #30 TAB





Sucralfate (Carafate) 1 Gm/10 Ml Oral.susp


1 GM ORAL FOUR TIMES A DAY for 14 Days, #14 ML





 


Continued Medications:  


Aspirin Ec* (Aspirin Ec*) 81 Mg Tablet.dr


81 MG ORAL DAILY for CAD, TAB





Atorvastatin Calcium* (Atorvastatin Calcium*) 40 Mg Tablet


40 MG ORAL BEDTIME for HLD, TAB





Carvedilol* (Carvedilol*) 6.25 Mg Tablet


6.25 MG ORAL EVERY 12 HOURS for CHF, TAB





Furosemide* (Lasix*) 40 Mg Tablet


40 MG ORAL DAILY for CHF, TAB





Losartan Potassium* (Losartan Potassium*) 50 Mg Tablet


50 MG ORAL DAILY for HTN, TAB





Nitroglycerin 0.4MG table* (Nitroglycerin*) 0.4 Mg Tab.subl


0.4 MG SL .Q5MIN X 3 DOSES PRN for CHEST PAIN, TAB





Sertraline Hcl* (Sertraline Hcl*) 25 Mg Tablet


50 MG ORAL DAILY for DEPRESSION, TAB











Discharge


Condition Upon Discharge:  improving


Discharge Vital Signs





Last Vital Signs








  Date Time  Temp Pulse Resp B/P (MAP) Pulse Ox O2 Delivery O2 Flow Rate FiO2


 


3/10/20 12:00  94      


 


3/10/20 09:00    120/76    


 


3/10/20 08:35      Room Air  


 


3/10/20 08:00 97.9  20  99   


 


3/7/20 21:00       2.0 





       2.0 


 


3/6/20 07:28        28








Discharge Disposition


Patient was discharged to home


Discharge Diagnoses:  


(1) Hematemesis


(2) Chest pain


(3) Metabolic acidosis


(4) Elevated troponin











Aaron Alcazar MD Mar 10, 2020 14:30

## 2020-03-10 NOTE — CARDIOLOGY PROGRESS NOTE
Assessment/Plan


Status:  stable


Assessment/Plan


Assessment/Plan


Status:  stable


Assessment/Plan:


Assessment:


Chest pain


CHF acute on chronic


NSVT


Elevated troponin/ACS


Elevated LFT





Plan:


restart when cleared by GI :Aspirin/plavix


d/c heparin


Continue coreg/losartan - increased coreg dose 12.5 mg bid


Start aldactone 25 mg daily


Spot dose lasix prn


Statin


Nitro prn


Stress test negative


Pacemaker interrogation, patient unaware if device if PPM or ICD-> if no ICD 

then arrange lifevest - EP consulted to assist


Clear for discharge





Subjective


Cardiovascular:  Reports: no symptoms


Respiratory:  Reports: no symptoms


Gastrointestinal/Abdominal:  Reports: no symptoms


Genitourinary:  Reports: no symptoms


Subjective


NO acute events, H.H stable, vitals stable, no complaints, stress test negative





Objective





Last 24 Hour Vital Signs








  Date Time  Temp Pulse Resp B/P (MAP) Pulse Ox O2 Delivery O2 Flow Rate FiO2


 


3/10/20 09:00  95  120/76    


 


3/10/20 09:00    120/76    


 


3/10/20 08:35      Room Air  


 


3/10/20 08:00 97.9 95 20 120/76 (91) 99   


 


3/10/20 08:00  95      


 


3/10/20 04:00 98.1 95 20 101/71 (81) 99   


 


3/10/20 04:00  93      


 


3/10/20 00:00 98.6 94 20 114/73 (87) 99   


 


3/10/20 00:00  93      


 


3/9/20 21:00      Room Air  


 


3/9/20 20:00  103      


 


3/9/20 20:00 98.1 95 20 117/75 (89) 97   


 


3/9/20 16:00 97.0 92 18 101/71 (81) 100   


 


3/9/20 16:00  110      


 


3/9/20 14:03      Room Air  


 


3/9/20 12:00  83      


 


3/9/20 12:00 97.9 91 20 126/69 (88) 100   








General Appearance:  no apparent distress, alert


EENT:  PERRL/EOMI, normal ENT inspection, TMs normal, pharynx normal


Neck:  non-tender, normal alignment, supple, normal inspection, no JVD


Rhythm:  NSR


Cardiovascular:  normal peripheral pulses, normal rate


Respiratory/Chest:  chest wall non-tender, lungs clear, normal breath sounds, 

no respiratory distress, no accessory muscle use


Abdomen:  normal bowel sounds, non tender, soft, no organomegaly, no mass


Extremities:  normal range of motion, non-tender


Neurologic:  CNs II-XII grossly normal, no motor/sensory deficits











Intake and Output  


 


 3/9/20 3/10/20





 19:00 07:00


 


Intake Total 250 ml 


 


Output Total 600 ml 


 


Balance -350 ml 


 


  


 


Intake Oral 250 ml 


 


Output Urine Total 600 ml 


 


# Voids  4


 


# Bowel Movements  1











Laboratory Tests








Test


  3/10/20


05:38


 


White Blood Count


  5.1 K/UL


(4.8-10.8)


 


Red Blood Count


  2.71 M/UL


(4.70-6.10)  L


 


Hemoglobin


  8.3 G/DL


(14.2-18.0)  L


 


Hematocrit


  25.3 %


(42.0-52.0)  L


 


Mean Corpuscular Volume 93 FL (80-99)  


 


Mean Corpuscular Hemoglobin


  30.8 PG


(27.0-31.0)


 


Mean Corpuscular Hemoglobin


Concent 33.0 G/DL


(32.0-36.0)


 


Red Cell Distribution Width


  13.5 %


(11.6-14.8)


 


Platelet Count


  165 K/UL


(150-450)


 


Mean Platelet Volume


  6.0 FL


(6.5-10.1)  L


 


Neutrophils (%) (Auto)


  54.9 %


(45.0-75.0)


 


Lymphocytes (%) (Auto)


  28.9 %


(20.0-45.0)


 


Monocytes (%) (Auto)


  11.9 %


(1.0-10.0)  H


 


Eosinophils (%) (Auto)


  3.4 %


(0.0-3.0)  H


 


Basophils (%) (Auto)


  0.8 %


(0.0-2.0)


 


Sodium Level


  140 MMOL/L


(136-145)


 


Potassium Level


  3.7 MMOL/L


(3.5-5.1)


 


Chloride Level


  105 MMOL/L


()


 


Carbon Dioxide Level


  25 MMOL/L


(21-32)


 


Anion Gap


  10 mmol/L


(5-15)


 


Blood Urea Nitrogen


  11 mg/dL


(7-18)


 


Creatinine


  0.7 MG/DL


(0.55-1.30)


 


Estimat Glomerular Filtration


Rate > 60 mL/min


(>60)


 


Glucose Level


  100 MG/DL


()


 


Calcium Level


  8.6 MG/DL


(8.5-10.1)


 


Phosphorus Level


  4.1 MG/DL


(2.5-4.9)


 


Magnesium Level


  1.7 MG/DL


(1.8-2.4)  L


 


Total Bilirubin


  0.5 MG/DL


(0.2-1.0)


 


Aspartate Amino Transf


(AST/SGOT) 39 U/L (15-37)


H


 


Alanine Aminotransferase


(ALT/SGPT) 64 U/L (12-78)


 


 


Alkaline Phosphatase


  46 U/L


()


 


Total Protein


  6.3 G/DL


(6.4-8.2)  L


 


Albumin


  2.8 G/DL


(3.4-5.0)  L


 


Globulin 3.5 g/dL  


 


Albumin/Globulin Ratio


  0.8 (1.0-2.7)


L

















Ean Marin MD Mar 10, 2020 10:29

## 2020-03-10 NOTE — GENERAL PROGRESS NOTE
Assessment/Plan


Problem List:  


(1) Hematemesis


ICD Codes:  K92.0 - Hematemesis


SNOMED:  0199696


(2) Abnormal LFTs


ICD Codes:  R94.5 - Abnormal results of liver function studies


SNOMED:  465254532


(3) Chest pain


ICD Codes:  R07.9 - Chest pain, unspecified


SNOMED:  66012159


(4) Elevated troponin


ICD Codes:  R79.89 - Other specified abnormal findings of blood chemistry


SNOMED:  428660307, 794785196, 382036069


(5) Diverticulosis


ICD Codes:  K57.90 - Diverticulosis of intestine, part unspecified, without 

perforation or abscess without bleeding


SNOMED:  029414844


(6) Kidney calculi


ICD Codes:  N20.0 - Calculus of kidney


SNOMED:  17849780


(7) Fatty liver


ICD Codes:  K76.0 - Fatty (change of) liver, not elsewhere classified


SNOMED:  140220617


Status:  stable


Assessment/Plan:


no recurrent bleed post EGD


ppi to Q12


fu cbc


repeat lfts>>>improving


 carafate


fu cardiology





Subjective


ROS Limited/Unobtainable:  Yes


Allergies:  


Coded Allergies:  


     No Known Allergies (Unverified , 3/2/20)





Objective





Last 24 Hour Vital Signs








  Date Time  Temp Pulse Resp B/P (MAP) Pulse Ox O2 Delivery O2 Flow Rate FiO2


 


3/10/20 09:00  95  120/76    


 


3/10/20 09:00    120/76    


 


3/10/20 08:35      Room Air  


 


3/10/20 08:00 97.9 95 20 120/76 (91) 99   


 


3/10/20 08:00  95      


 


3/10/20 04:00 98.1 95 20 101/71 (81) 99   


 


3/10/20 04:00  93      


 


3/10/20 00:00 98.6 94 20 114/73 (87) 99   


 


3/10/20 00:00  93      


 


3/9/20 21:00      Room Air  


 


3/9/20 20:00  103      


 


3/9/20 20:00 98.1 95 20 117/75 (89) 97   


 


3/9/20 16:00 97.0 92 18 101/71 (81) 100   


 


3/9/20 16:00  110      


 


3/9/20 14:03      Room Air  


 


3/9/20 12:00  83      


 


3/9/20 12:00 97.9 91 20 126/69 (88) 100   

















Intake and Output  


 


 3/9/20 3/10/20





 19:00 07:00


 


Intake Total 250 ml 


 


Output Total 600 ml 


 


Balance -350 ml 


 


  


 


Intake Oral 250 ml 


 


Output Urine Total 600 ml 


 


# Voids  4


 


# Bowel Movements  1








Laboratory Tests


3/10/20 05:38: 


White Blood Count 5.1, Red Blood Count 2.71L, Hemoglobin 8.3L, Hematocrit 25.3L

, Mean Corpuscular Volume 93, Mean Corpuscular Hemoglobin 30.8, Mean 

Corpuscular Hemoglobin Concent 33.0, Red Cell Distribution Width 13.5, Platelet 

Count 165, Mean Platelet Volume 6.0L, Neutrophils (%) (Auto) 54.9, Lymphocytes (

%) (Auto) 28.9, Monocytes (%) (Auto) 11.9H, Eosinophils (%) (Auto) 3.4H, 

Basophils (%) (Auto) 0.8, Sodium Level 140, Potassium Level 3.7, Chloride Level 

105, Carbon Dioxide Level 25, Anion Gap 10, Blood Urea Nitrogen 11, Creatinine 

0.7, Estimat Glomerular Filtration Rate > 60, Glucose Level 100, Calcium Level 

8.6, Phosphorus Level 4.1, Magnesium Level 1.7L, Total Bilirubin 0.5, Aspartate 

Amino Transf (AST/SGOT) 39H, Alanine Aminotransferase (ALT/SGPT) 64, Alkaline 

Phosphatase 46, Total Protein 6.3L, Albumin 2.8L, Globulin 3.5, Albumin/

Globulin Ratio 0.8L


Height (Feet):  5


Height (Inches):  6.00


Weight (Pounds):  213


General Appearance:  alert


EENT:  normal ENT inspection


Neck:  supple


Cardiovascular:  normal rate


Respiratory/Chest:  decreased breath sounds


Abdomen:  normal bowel sounds, non tender, soft


Extremities:  non-tender











Elier Elizabeth MD Mar 10, 2020 10:24

## 2020-03-10 NOTE — CARDIAC ELECTROPHYSIOLOGY PN
Subjective


Subjective


[id number]





Objective





Last 24 Hour Vital Signs








  Date Time  Temp Pulse Resp B/P (MAP) Pulse Ox O2 Delivery O2 Flow Rate FiO2


 


3/10/20 09:00  95  120/76    


 


3/10/20 09:00    120/76    


 


3/10/20 08:35      Room Air  


 


3/10/20 08:00 97.9 95 20 120/76 (91) 99   


 


3/10/20 08:00  95      


 


3/10/20 04:00 98.1 95 20 101/71 (81) 99   


 


3/10/20 04:00  93      


 


3/10/20 00:00 98.6 94 20 114/73 (87) 99   


 


3/10/20 00:00  93      


 


3/9/20 21:00      Room Air  


 


3/9/20 20:00  103      


 


3/9/20 20:00 98.1 95 20 117/75 (89) 97   


 


3/9/20 16:00 97.0 92 18 101/71 (81) 100   


 


3/9/20 16:00  110      


 


3/9/20 14:03      Room Air  


 


3/9/20 12:00  83      


 


3/9/20 12:00 97.9 91 20 126/69 (88) 100   

















Intake and Output  


 


 3/9/20 3/10/20





 19:00 07:00


 


Intake Total 250 ml 


 


Output Total 600 ml 


 


Balance -350 ml 


 


  


 


Intake Oral 250 ml 


 


Output Urine Total 600 ml 


 


# Voids  4


 


# Bowel Movements  1











Laboratory Tests








Test


  3/10/20


05:38


 


White Blood Count


  5.1 K/UL


(4.8-10.8)


 


Red Blood Count


  2.71 M/UL


(4.70-6.10)  L


 


Hemoglobin


  8.3 G/DL


(14.2-18.0)  L


 


Hematocrit


  25.3 %


(42.0-52.0)  L


 


Mean Corpuscular Volume 93 FL (80-99)  


 


Mean Corpuscular Hemoglobin


  30.8 PG


(27.0-31.0)


 


Mean Corpuscular Hemoglobin


Concent 33.0 G/DL


(32.0-36.0)


 


Red Cell Distribution Width


  13.5 %


(11.6-14.8)


 


Platelet Count


  165 K/UL


(150-450)


 


Mean Platelet Volume


  6.0 FL


(6.5-10.1)  L


 


Neutrophils (%) (Auto)


  54.9 %


(45.0-75.0)


 


Lymphocytes (%) (Auto)


  28.9 %


(20.0-45.0)


 


Monocytes (%) (Auto)


  11.9 %


(1.0-10.0)  H


 


Eosinophils (%) (Auto)


  3.4 %


(0.0-3.0)  H


 


Basophils (%) (Auto)


  0.8 %


(0.0-2.0)


 


Sodium Level


  140 MMOL/L


(136-145)


 


Potassium Level


  3.7 MMOL/L


(3.5-5.1)


 


Chloride Level


  105 MMOL/L


()


 


Carbon Dioxide Level


  25 MMOL/L


(21-32)


 


Anion Gap


  10 mmol/L


(5-15)


 


Blood Urea Nitrogen


  11 mg/dL


(7-18)


 


Creatinine


  0.7 MG/DL


(0.55-1.30)


 


Estimat Glomerular Filtration


Rate > 60 mL/min


(>60)


 


Glucose Level


  100 MG/DL


()


 


Calcium Level


  8.6 MG/DL


(8.5-10.1)


 


Phosphorus Level


  4.1 MG/DL


(2.5-4.9)


 


Magnesium Level


  1.7 MG/DL


(1.8-2.4)  L


 


Total Bilirubin


  0.5 MG/DL


(0.2-1.0)


 


Aspartate Amino Transf


(AST/SGOT) 39 U/L (15-37)


H


 


Alanine Aminotransferase


(ALT/SGPT) 64 U/L (12-78)


 


 


Alkaline Phosphatase


  46 U/L


()


 


Total Protein


  6.3 G/DL


(6.4-8.2)  L


 


Albumin


  2.8 G/DL


(3.4-5.0)  L


 


Globulin 3.5 g/dL  


 


Albumin/Globulin Ratio


  0.8 (1.0-2.7)


L

















Jono De Dios MD Mar 10, 2020 10:44

## 2020-09-10 ENCOUNTER — HOSPITAL ENCOUNTER (INPATIENT)
Dept: HOSPITAL 87 - ER | Age: 65
LOS: 6 days | Discharge: HOME HEALTH | DRG: 203 | End: 2020-09-16
Attending: INTERNAL MEDICINE | Admitting: INTERNAL MEDICINE
Payer: MEDICAID

## 2020-09-10 VITALS — BODY MASS INDEX: 35.98 KG/M2 | HEIGHT: 67 IN | WEIGHT: 229.25 LBS

## 2020-09-10 DIAGNOSIS — M94.0: Primary | ICD-10-CM

## 2020-09-10 DIAGNOSIS — Z59.0: ICD-10-CM

## 2020-09-10 DIAGNOSIS — I50.23: ICD-10-CM

## 2020-09-10 DIAGNOSIS — Z95.810: ICD-10-CM
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
## 2020-09-10 DIAGNOSIS — Z82.3: ICD-10-CM

## 2020-09-10 DIAGNOSIS — E66.09: ICD-10-CM

## 2020-09-10 DIAGNOSIS — I49.3: ICD-10-CM

## 2020-09-10 DIAGNOSIS — Z79.82: ICD-10-CM

## 2020-09-10 DIAGNOSIS — I11.0: ICD-10-CM

## 2020-09-10 DIAGNOSIS — Z79.899: ICD-10-CM

## 2020-09-10 DIAGNOSIS — I87.8: ICD-10-CM

## 2020-09-10 DIAGNOSIS — G47.33: ICD-10-CM

## 2020-09-10 DIAGNOSIS — E87.6: ICD-10-CM

## 2020-09-10 DIAGNOSIS — I87.2: ICD-10-CM

## 2020-09-10 DIAGNOSIS — F10.129: ICD-10-CM

## 2020-09-10 DIAGNOSIS — I42.0: ICD-10-CM

## 2020-09-10 DIAGNOSIS — E11.9: ICD-10-CM

## 2020-09-10 DIAGNOSIS — D64.9: ICD-10-CM

## 2020-09-10 DIAGNOSIS — E44.1: ICD-10-CM

## 2020-09-10 DIAGNOSIS — E78.5: ICD-10-CM

## 2020-09-10 DIAGNOSIS — Z82.49: ICD-10-CM

## 2020-09-10 LAB
AMPHETAMINES UR QL SCN: NEGATIVE
BARBITURATES UR QL SCN: NEGATIVE
BASOPHILS NFR BLD AUTO: 1.2 % (ref 0–2)
BENZODIAZ UR QL SCN: (no result)
BZE UR QL SCN: NEGATIVE
CANNABINOIDS UR QL SCN: NEGATIVE
CHLORIDE SERPL-SCNC: 105 MEQ/L (ref 98–107)
EOSINOPHIL NFR BLD AUTO: 1 % (ref 0–5)
ERYTHROCYTE [DISTWIDTH] IN BLOOD BY AUTOMATED COUNT: 18.6 % (ref 11.6–14.6)
ETHANOL SERPL-MCNC: 173 MG/DL
HCT VFR BLD AUTO: 30.4 % (ref 42–52)
HGB BLD-MCNC: 9.6 G/DL (ref 14–18)
LYMPHOCYTES NFR BLD AUTO: 25.5 % (ref 20–50)
MCH RBC QN AUTO: 23.4 PG (ref 28–32)
MCV RBC AUTO: 73.7 FL (ref 80–94)
METHADONE UR QL SCN: NEGATIVE
MONOCYTES NFR BLD AUTO: 10.4 % (ref 2–8)
NEUTROPHILS NFR BLD AUTO: 61.9 % (ref 40–76)
OPIATES UR QL SCN: NEGATIVE
PCP UR QL SCN: NEGATIVE
PLATELET # BLD AUTO: 360 X1000/UL (ref 130–400)
PMV BLD AUTO: 6.9 FL (ref 7.4–10.4)
RBC # BLD AUTO: 4.12 MILL/UL (ref 4.7–6.1)

## 2020-09-10 PROCEDURE — 36415 COLL VENOUS BLD VENIPUNCTURE: CPT

## 2020-09-10 PROCEDURE — 74018 RADEX ABDOMEN 1 VIEW: CPT

## 2020-09-10 PROCEDURE — 93005 ELECTROCARDIOGRAM TRACING: CPT

## 2020-09-10 PROCEDURE — 85025 COMPLETE CBC W/AUTO DIFF WBC: CPT

## 2020-09-10 PROCEDURE — 80305 DRUG TEST PRSMV DIR OPT OBS: CPT

## 2020-09-10 PROCEDURE — 84484 ASSAY OF TROPONIN QUANT: CPT

## 2020-09-10 PROCEDURE — 80053 COMPREHEN METABOLIC PANEL: CPT

## 2020-09-10 PROCEDURE — 76705 ECHO EXAM OF ABDOMEN: CPT

## 2020-09-10 PROCEDURE — 82962 GLUCOSE BLOOD TEST: CPT

## 2020-09-10 PROCEDURE — 71045 X-RAY EXAM CHEST 1 VIEW: CPT

## 2020-09-10 PROCEDURE — 80048 BASIC METABOLIC PNL TOTAL CA: CPT

## 2020-09-10 PROCEDURE — 99291 CRITICAL CARE FIRST HOUR: CPT

## 2020-09-10 PROCEDURE — G0480 DRUG TEST DEF 1-7 CLASSES: HCPCS

## 2020-09-10 PROCEDURE — 80320 DRUG SCREEN QUANTALCOHOLS: CPT

## 2020-09-10 PROCEDURE — 83880 ASSAY OF NATRIURETIC PEPTIDE: CPT

## 2020-09-10 PROCEDURE — 93306 TTE W/DOPPLER COMPLETE: CPT

## 2020-09-10 SDOH — ECONOMIC STABILITY - HOUSING INSECURITY: HOMELESSNESS: Z59.0

## 2020-09-11 VITALS — SYSTOLIC BLOOD PRESSURE: 113 MMHG | DIASTOLIC BLOOD PRESSURE: 47 MMHG

## 2020-09-11 VITALS — SYSTOLIC BLOOD PRESSURE: 152 MMHG | DIASTOLIC BLOOD PRESSURE: 76 MMHG

## 2020-09-11 VITALS — SYSTOLIC BLOOD PRESSURE: 182 MMHG | DIASTOLIC BLOOD PRESSURE: 105 MMHG

## 2020-09-11 VITALS — SYSTOLIC BLOOD PRESSURE: 136 MMHG | DIASTOLIC BLOOD PRESSURE: 78 MMHG

## 2020-09-11 VITALS — SYSTOLIC BLOOD PRESSURE: 126 MMHG | DIASTOLIC BLOOD PRESSURE: 88 MMHG

## 2020-09-11 VITALS — DIASTOLIC BLOOD PRESSURE: 68 MMHG | SYSTOLIC BLOOD PRESSURE: 144 MMHG

## 2020-09-11 VITALS — SYSTOLIC BLOOD PRESSURE: 146 MMHG | DIASTOLIC BLOOD PRESSURE: 68 MMHG

## 2020-09-11 VITALS — DIASTOLIC BLOOD PRESSURE: 76 MMHG | SYSTOLIC BLOOD PRESSURE: 133 MMHG

## 2020-09-11 VITALS — SYSTOLIC BLOOD PRESSURE: 120 MMHG | DIASTOLIC BLOOD PRESSURE: 59 MMHG

## 2020-09-11 VITALS — SYSTOLIC BLOOD PRESSURE: 138 MMHG | DIASTOLIC BLOOD PRESSURE: 88 MMHG

## 2020-09-11 VITALS — DIASTOLIC BLOOD PRESSURE: 112 MMHG | SYSTOLIC BLOOD PRESSURE: 159 MMHG

## 2020-09-11 VITALS — DIASTOLIC BLOOD PRESSURE: 110 MMHG | SYSTOLIC BLOOD PRESSURE: 174 MMHG

## 2020-09-11 VITALS — DIASTOLIC BLOOD PRESSURE: 72 MMHG | SYSTOLIC BLOOD PRESSURE: 133 MMHG

## 2020-09-11 RX ADMIN — FAMOTIDINE SCH MG: 10 INJECTION INTRAVENOUS at 10:36

## 2020-09-11 RX ADMIN — ENOXAPARIN SODIUM SCH MG: 120 INJECTION SUBCUTANEOUS at 11:52

## 2020-09-11 RX ADMIN — CHLORDIAZEPOXIDE HYDROCHLORIDE SCH MG: 25 CAPSULE ORAL at 14:03

## 2020-09-11 RX ADMIN — Medication SCH TAB: at 10:34

## 2020-09-11 RX ADMIN — Medication SCH MG: at 10:34

## 2020-09-11 RX ADMIN — Medication SCH UDTAB: at 10:34

## 2020-09-11 RX ADMIN — ENOXAPARIN SODIUM SCH MG: 120 INJECTION SUBCUTANEOUS at 20:09

## 2020-09-11 RX ADMIN — CARVEDILOL SCH MG: 12.5 TABLET, FILM COATED ORAL at 20:09

## 2020-09-11 RX ADMIN — HYDROCODONE BITARTRATE AND ACETAMINOPHEN PRN TAB: 5; 325 TABLET ORAL at 10:34

## 2020-09-11 RX ADMIN — LOSARTAN POTASSIUM SCH MG: 50 TABLET ORAL at 10:35

## 2020-09-11 RX ADMIN — CHLORDIAZEPOXIDE HYDROCHLORIDE SCH MG: 25 CAPSULE ORAL at 20:53

## 2020-09-11 RX ADMIN — FUROSEMIDE SCH MG: 10 INJECTION, SOLUTION INTRAMUSCULAR; INTRAVENOUS at 18:42

## 2020-09-11 RX ADMIN — FAMOTIDINE SCH MG: 10 INJECTION INTRAVENOUS at 18:43

## 2020-09-12 VITALS — SYSTOLIC BLOOD PRESSURE: 139 MMHG | DIASTOLIC BLOOD PRESSURE: 73 MMHG

## 2020-09-12 VITALS — DIASTOLIC BLOOD PRESSURE: 76 MMHG | SYSTOLIC BLOOD PRESSURE: 141 MMHG

## 2020-09-12 VITALS — SYSTOLIC BLOOD PRESSURE: 126 MMHG | DIASTOLIC BLOOD PRESSURE: 78 MMHG

## 2020-09-12 VITALS — SYSTOLIC BLOOD PRESSURE: 141 MMHG | DIASTOLIC BLOOD PRESSURE: 72 MMHG

## 2020-09-12 VITALS — DIASTOLIC BLOOD PRESSURE: 85 MMHG | SYSTOLIC BLOOD PRESSURE: 121 MMHG

## 2020-09-12 VITALS — SYSTOLIC BLOOD PRESSURE: 101 MMHG | DIASTOLIC BLOOD PRESSURE: 62 MMHG

## 2020-09-12 VITALS — SYSTOLIC BLOOD PRESSURE: 103 MMHG | DIASTOLIC BLOOD PRESSURE: 60 MMHG

## 2020-09-12 VITALS — SYSTOLIC BLOOD PRESSURE: 118 MMHG | DIASTOLIC BLOOD PRESSURE: 73 MMHG

## 2020-09-12 VITALS — SYSTOLIC BLOOD PRESSURE: 104 MMHG | DIASTOLIC BLOOD PRESSURE: 67 MMHG

## 2020-09-12 VITALS — DIASTOLIC BLOOD PRESSURE: 79 MMHG | SYSTOLIC BLOOD PRESSURE: 127 MMHG

## 2020-09-12 VITALS — SYSTOLIC BLOOD PRESSURE: 108 MMHG | DIASTOLIC BLOOD PRESSURE: 78 MMHG

## 2020-09-12 LAB
BASOPHILS NFR BLD AUTO: 0.9 % (ref 0–2)
CHLORIDE SERPL-SCNC: 98 MEQ/L (ref 98–107)
EOSINOPHIL NFR BLD AUTO: 6.1 % (ref 0–5)
ERYTHROCYTE [DISTWIDTH] IN BLOOD BY AUTOMATED COUNT: 18.6 % (ref 11.6–14.6)
HCT VFR BLD AUTO: 31.2 % (ref 42–52)
HGB BLD-MCNC: 9.8 G/DL (ref 14–18)
INR PPP: 1
LYMPHOCYTES NFR BLD AUTO: 30.2 % (ref 20–50)
MCH RBC QN AUTO: 22.9 PG (ref 28–32)
MCV RBC AUTO: 72.9 FL (ref 80–94)
MONOCYTES NFR BLD AUTO: 8.5 % (ref 2–8)
NEUTROPHILS NFR BLD AUTO: 54.3 % (ref 40–76)
PLATELET # BLD AUTO: 301 X1000/UL (ref 130–400)
PMV BLD AUTO: 7.3 FL (ref 7.4–10.4)
PROTHROMBIN TIME: 10.9 SEC (ref 9.6–11)
RBC # BLD AUTO: 4.28 MILL/UL (ref 4.7–6.1)

## 2020-09-12 RX ADMIN — CARVEDILOL SCH MG: 12.5 TABLET, FILM COATED ORAL at 08:38

## 2020-09-12 RX ADMIN — Medication SCH MG: at 08:37

## 2020-09-12 RX ADMIN — FAMOTIDINE SCH MG: 10 INJECTION INTRAVENOUS at 16:28

## 2020-09-12 RX ADMIN — FAMOTIDINE SCH MG: 10 INJECTION INTRAVENOUS at 08:36

## 2020-09-12 RX ADMIN — CHLORDIAZEPOXIDE HYDROCHLORIDE SCH MG: 25 CAPSULE ORAL at 21:04

## 2020-09-12 RX ADMIN — ENOXAPARIN SODIUM SCH MG: 120 INJECTION SUBCUTANEOUS at 21:03

## 2020-09-12 RX ADMIN — LOSARTAN POTASSIUM SCH MG: 50 TABLET ORAL at 08:36

## 2020-09-12 RX ADMIN — Medication SCH TAB: at 08:37

## 2020-09-12 RX ADMIN — CHLORDIAZEPOXIDE HYDROCHLORIDE SCH MG: 25 CAPSULE ORAL at 05:58

## 2020-09-12 RX ADMIN — CARVEDILOL SCH MG: 12.5 TABLET, FILM COATED ORAL at 21:04

## 2020-09-12 RX ADMIN — FUROSEMIDE SCH MG: 10 INJECTION, SOLUTION INTRAMUSCULAR; INTRAVENOUS at 08:36

## 2020-09-12 RX ADMIN — Medication SCH UDTAB: at 08:36

## 2020-09-12 RX ADMIN — HYDROCODONE BITARTRATE AND ACETAMINOPHEN PRN TAB: 5; 325 TABLET ORAL at 12:04

## 2020-09-12 RX ADMIN — ENOXAPARIN SODIUM SCH MG: 120 INJECTION SUBCUTANEOUS at 08:37

## 2020-09-12 RX ADMIN — CHLORDIAZEPOXIDE HYDROCHLORIDE SCH MG: 25 CAPSULE ORAL at 13:32

## 2020-09-12 RX ADMIN — POTASSIUM CHLORIDE SCH MEQ: 20 TABLET, EXTENDED RELEASE ORAL at 08:37

## 2020-09-12 RX ADMIN — FUROSEMIDE SCH MG: 10 INJECTION, SOLUTION INTRAMUSCULAR; INTRAVENOUS at 16:28

## 2020-09-13 VITALS — DIASTOLIC BLOOD PRESSURE: 71 MMHG | SYSTOLIC BLOOD PRESSURE: 117 MMHG

## 2020-09-13 VITALS — DIASTOLIC BLOOD PRESSURE: 50 MMHG | SYSTOLIC BLOOD PRESSURE: 93 MMHG

## 2020-09-13 VITALS — DIASTOLIC BLOOD PRESSURE: 71 MMHG | SYSTOLIC BLOOD PRESSURE: 110 MMHG

## 2020-09-13 VITALS — DIASTOLIC BLOOD PRESSURE: 47 MMHG | SYSTOLIC BLOOD PRESSURE: 92 MMHG

## 2020-09-13 VITALS — SYSTOLIC BLOOD PRESSURE: 136 MMHG | DIASTOLIC BLOOD PRESSURE: 80 MMHG

## 2020-09-13 VITALS — SYSTOLIC BLOOD PRESSURE: 98 MMHG | DIASTOLIC BLOOD PRESSURE: 62 MMHG

## 2020-09-13 VITALS — SYSTOLIC BLOOD PRESSURE: 107 MMHG | DIASTOLIC BLOOD PRESSURE: 71 MMHG

## 2020-09-13 VITALS — DIASTOLIC BLOOD PRESSURE: 64 MMHG | SYSTOLIC BLOOD PRESSURE: 108 MMHG

## 2020-09-13 VITALS — SYSTOLIC BLOOD PRESSURE: 121 MMHG | DIASTOLIC BLOOD PRESSURE: 55 MMHG

## 2020-09-13 VITALS — SYSTOLIC BLOOD PRESSURE: 109 MMHG | DIASTOLIC BLOOD PRESSURE: 64 MMHG

## 2020-09-13 VITALS — SYSTOLIC BLOOD PRESSURE: 95 MMHG | DIASTOLIC BLOOD PRESSURE: 51 MMHG

## 2020-09-13 VITALS — DIASTOLIC BLOOD PRESSURE: 69 MMHG | SYSTOLIC BLOOD PRESSURE: 119 MMHG

## 2020-09-13 VITALS — SYSTOLIC BLOOD PRESSURE: 100 MMHG | DIASTOLIC BLOOD PRESSURE: 46 MMHG

## 2020-09-13 LAB
BASOPHILS NFR BLD AUTO: 0.7 % (ref 0–2)
CHLORIDE SERPL-SCNC: 100 MEQ/L (ref 98–107)
EOSINOPHIL NFR BLD AUTO: 5.2 % (ref 0–5)
ERYTHROCYTE [DISTWIDTH] IN BLOOD BY AUTOMATED COUNT: 18 % (ref 11.6–14.6)
HCT VFR BLD AUTO: 31 % (ref 42–52)
HGB BLD-MCNC: 9.7 G/DL (ref 14–18)
LYMPHOCYTES NFR BLD AUTO: 32 % (ref 20–50)
MCH RBC QN AUTO: 22.8 PG (ref 28–32)
MCV RBC AUTO: 72.6 FL (ref 80–94)
MONOCYTES NFR BLD AUTO: 9.1 % (ref 2–8)
NEUTROPHILS NFR BLD AUTO: 53 % (ref 40–76)
PLATELET # BLD AUTO: 296 X1000/UL (ref 130–400)
PMV BLD AUTO: 7.5 FL (ref 7.4–10.4)
RBC # BLD AUTO: 4.27 MILL/UL (ref 4.7–6.1)

## 2020-09-13 RX ADMIN — ENOXAPARIN SODIUM SCH MG: 120 INJECTION SUBCUTANEOUS at 08:28

## 2020-09-13 RX ADMIN — ENOXAPARIN SODIUM SCH MG: 120 INJECTION SUBCUTANEOUS at 20:00

## 2020-09-13 RX ADMIN — Medication SCH UDTAB: at 08:27

## 2020-09-13 RX ADMIN — HYDROCODONE BITARTRATE AND ACETAMINOPHEN PRN TAB: 5; 325 TABLET ORAL at 11:11

## 2020-09-13 RX ADMIN — Medication SCH MG: at 08:27

## 2020-09-13 RX ADMIN — CARVEDILOL SCH MG: 12.5 TABLET, FILM COATED ORAL at 08:27

## 2020-09-13 RX ADMIN — FUROSEMIDE SCH MG: 10 INJECTION, SOLUTION INTRAMUSCULAR; INTRAVENOUS at 08:28

## 2020-09-13 RX ADMIN — FAMOTIDINE SCH MG: 10 INJECTION INTRAVENOUS at 08:28

## 2020-09-13 RX ADMIN — FUROSEMIDE SCH MG: 10 INJECTION, SOLUTION INTRAMUSCULAR; INTRAVENOUS at 17:11

## 2020-09-13 RX ADMIN — CHLORDIAZEPOXIDE HYDROCHLORIDE SCH MG: 25 CAPSULE ORAL at 12:36

## 2020-09-13 RX ADMIN — CHLORDIAZEPOXIDE HYDROCHLORIDE SCH MG: 25 CAPSULE ORAL at 22:44

## 2020-09-13 RX ADMIN — CHLORDIAZEPOXIDE HYDROCHLORIDE SCH MG: 25 CAPSULE ORAL at 05:24

## 2020-09-13 RX ADMIN — Medication SCH TAB: at 08:27

## 2020-09-13 RX ADMIN — CARVEDILOL SCH MG: 12.5 TABLET, FILM COATED ORAL at 20:00

## 2020-09-13 RX ADMIN — POTASSIUM CHLORIDE SCH MEQ: 20 TABLET, EXTENDED RELEASE ORAL at 08:27

## 2020-09-13 RX ADMIN — FAMOTIDINE SCH MG: 10 INJECTION INTRAVENOUS at 17:35

## 2020-09-13 RX ADMIN — LOSARTAN POTASSIUM SCH MG: 50 TABLET ORAL at 08:27

## 2020-09-14 VITALS — DIASTOLIC BLOOD PRESSURE: 60 MMHG | SYSTOLIC BLOOD PRESSURE: 125 MMHG

## 2020-09-14 VITALS — DIASTOLIC BLOOD PRESSURE: 63 MMHG | SYSTOLIC BLOOD PRESSURE: 115 MMHG

## 2020-09-14 VITALS — DIASTOLIC BLOOD PRESSURE: 71 MMHG | SYSTOLIC BLOOD PRESSURE: 123 MMHG

## 2020-09-14 VITALS — DIASTOLIC BLOOD PRESSURE: 80 MMHG | SYSTOLIC BLOOD PRESSURE: 112 MMHG

## 2020-09-14 VITALS — DIASTOLIC BLOOD PRESSURE: 74 MMHG | SYSTOLIC BLOOD PRESSURE: 110 MMHG

## 2020-09-14 VITALS — DIASTOLIC BLOOD PRESSURE: 61 MMHG | SYSTOLIC BLOOD PRESSURE: 117 MMHG

## 2020-09-14 VITALS — DIASTOLIC BLOOD PRESSURE: 75 MMHG | SYSTOLIC BLOOD PRESSURE: 116 MMHG

## 2020-09-14 VITALS — SYSTOLIC BLOOD PRESSURE: 112 MMHG | DIASTOLIC BLOOD PRESSURE: 63 MMHG

## 2020-09-14 VITALS — DIASTOLIC BLOOD PRESSURE: 54 MMHG | SYSTOLIC BLOOD PRESSURE: 90 MMHG

## 2020-09-14 VITALS — SYSTOLIC BLOOD PRESSURE: 110 MMHG | DIASTOLIC BLOOD PRESSURE: 69 MMHG

## 2020-09-14 VITALS — DIASTOLIC BLOOD PRESSURE: 45 MMHG | SYSTOLIC BLOOD PRESSURE: 127 MMHG

## 2020-09-14 VITALS — SYSTOLIC BLOOD PRESSURE: 108 MMHG | DIASTOLIC BLOOD PRESSURE: 68 MMHG

## 2020-09-14 LAB — CHLORIDE SERPL-SCNC: 102 MEQ/L (ref 98–107)

## 2020-09-14 RX ADMIN — Medication SCH TAB: at 10:24

## 2020-09-14 RX ADMIN — POTASSIUM CHLORIDE SCH MEQ: 20 TABLET, EXTENDED RELEASE ORAL at 17:30

## 2020-09-14 RX ADMIN — ENOXAPARIN SODIUM SCH MG: 120 INJECTION SUBCUTANEOUS at 10:24

## 2020-09-14 RX ADMIN — HYDROCODONE BITARTRATE AND ACETAMINOPHEN PRN TAB: 5; 325 TABLET ORAL at 13:05

## 2020-09-14 RX ADMIN — SODIUM CHLORIDE SCH MG: 9 INJECTION, SOLUTION INTRAVENOUS at 17:31

## 2020-09-14 RX ADMIN — FAMOTIDINE SCH MG: 10 INJECTION INTRAVENOUS at 10:24

## 2020-09-14 RX ADMIN — POTASSIUM CHLORIDE SCH MEQ: 20 TABLET, EXTENDED RELEASE ORAL at 10:25

## 2020-09-14 RX ADMIN — ENOXAPARIN SODIUM SCH MG: 120 INJECTION SUBCUTANEOUS at 21:13

## 2020-09-14 RX ADMIN — CHLORDIAZEPOXIDE HYDROCHLORIDE SCH MG: 25 CAPSULE ORAL at 15:59

## 2020-09-14 RX ADMIN — CHLORDIAZEPOXIDE HYDROCHLORIDE SCH MG: 25 CAPSULE ORAL at 06:28

## 2020-09-14 RX ADMIN — FUROSEMIDE SCH MG: 10 INJECTION, SOLUTION INTRAMUSCULAR; INTRAVENOUS at 10:24

## 2020-09-14 RX ADMIN — CARVEDILOL SCH MG: 12.5 TABLET, FILM COATED ORAL at 21:00

## 2020-09-14 RX ADMIN — CHLORDIAZEPOXIDE HYDROCHLORIDE SCH MG: 25 CAPSULE ORAL at 21:13

## 2020-09-14 RX ADMIN — Medication SCH MG: at 10:25

## 2020-09-14 RX ADMIN — Medication SCH UDTAB: at 10:25

## 2020-09-14 RX ADMIN — FAMOTIDINE SCH MG: 10 INJECTION INTRAVENOUS at 17:30

## 2020-09-14 RX ADMIN — LOSARTAN POTASSIUM SCH MG: 50 TABLET ORAL at 10:25

## 2020-09-14 RX ADMIN — CARVEDILOL SCH MG: 12.5 TABLET, FILM COATED ORAL at 10:25

## 2020-09-15 VITALS — SYSTOLIC BLOOD PRESSURE: 103 MMHG | DIASTOLIC BLOOD PRESSURE: 70 MMHG

## 2020-09-15 VITALS — DIASTOLIC BLOOD PRESSURE: 53 MMHG | SYSTOLIC BLOOD PRESSURE: 101 MMHG

## 2020-09-15 VITALS — SYSTOLIC BLOOD PRESSURE: 114 MMHG | DIASTOLIC BLOOD PRESSURE: 62 MMHG

## 2020-09-15 VITALS — DIASTOLIC BLOOD PRESSURE: 67 MMHG | SYSTOLIC BLOOD PRESSURE: 115 MMHG

## 2020-09-15 VITALS — SYSTOLIC BLOOD PRESSURE: 108 MMHG | DIASTOLIC BLOOD PRESSURE: 50 MMHG

## 2020-09-15 VITALS — SYSTOLIC BLOOD PRESSURE: 110 MMHG | DIASTOLIC BLOOD PRESSURE: 67 MMHG

## 2020-09-15 VITALS — DIASTOLIC BLOOD PRESSURE: 70 MMHG | SYSTOLIC BLOOD PRESSURE: 115 MMHG

## 2020-09-15 VITALS — SYSTOLIC BLOOD PRESSURE: 101 MMHG | DIASTOLIC BLOOD PRESSURE: 66 MMHG

## 2020-09-15 VITALS — DIASTOLIC BLOOD PRESSURE: 70 MMHG | SYSTOLIC BLOOD PRESSURE: 110 MMHG

## 2020-09-15 VITALS — DIASTOLIC BLOOD PRESSURE: 57 MMHG | SYSTOLIC BLOOD PRESSURE: 95 MMHG

## 2020-09-15 VITALS — SYSTOLIC BLOOD PRESSURE: 93 MMHG | DIASTOLIC BLOOD PRESSURE: 46 MMHG

## 2020-09-15 VITALS — DIASTOLIC BLOOD PRESSURE: 61 MMHG | SYSTOLIC BLOOD PRESSURE: 101 MMHG

## 2020-09-15 LAB
BASOPHILS NFR BLD AUTO: 0.7 % (ref 0–2)
CHLORIDE SERPL-SCNC: 101 MEQ/L (ref 98–107)
EOSINOPHIL NFR BLD AUTO: 5.6 % (ref 0–5)
ERYTHROCYTE [DISTWIDTH] IN BLOOD BY AUTOMATED COUNT: 18.3 % (ref 11.6–14.6)
HCT VFR BLD AUTO: 32.5 % (ref 42–52)
HGB BLD-MCNC: 10 G/DL (ref 14–18)
LYMPHOCYTES NFR BLD AUTO: 32.8 % (ref 20–50)
MCH RBC QN AUTO: 22.9 PG (ref 28–32)
MCV RBC AUTO: 74.6 FL (ref 80–94)
MONOCYTES NFR BLD AUTO: 12.7 % (ref 2–8)
NEUTROPHILS NFR BLD AUTO: 48.2 % (ref 40–76)
PLATELET # BLD AUTO: 272 X1000/UL (ref 130–400)
PMV BLD AUTO: 7.3 FL (ref 7.4–10.4)
RBC # BLD AUTO: 4.36 MILL/UL (ref 4.7–6.1)

## 2020-09-15 RX ADMIN — ENOXAPARIN SODIUM SCH MG: 120 INJECTION SUBCUTANEOUS at 08:21

## 2020-09-15 RX ADMIN — HYDROCODONE BITARTRATE AND ACETAMINOPHEN PRN TAB: 5; 325 TABLET ORAL at 22:47

## 2020-09-15 RX ADMIN — FAMOTIDINE SCH MG: 10 INJECTION INTRAVENOUS at 17:17

## 2020-09-15 RX ADMIN — CARVEDILOL SCH MG: 12.5 TABLET, FILM COATED ORAL at 20:51

## 2020-09-15 RX ADMIN — Medication SCH TAB: at 08:21

## 2020-09-15 RX ADMIN — SODIUM CHLORIDE SCH MG: 9 INJECTION, SOLUTION INTRAVENOUS at 01:14

## 2020-09-15 RX ADMIN — POTASSIUM CHLORIDE SCH MEQ: 20 TABLET, EXTENDED RELEASE ORAL at 08:21

## 2020-09-15 RX ADMIN — ENOXAPARIN SODIUM SCH MG: 120 INJECTION SUBCUTANEOUS at 20:50

## 2020-09-15 RX ADMIN — POTASSIUM CHLORIDE SCH MEQ: 20 TABLET, EXTENDED RELEASE ORAL at 17:17

## 2020-09-15 RX ADMIN — CARVEDILOL SCH MG: 12.5 TABLET, FILM COATED ORAL at 08:22

## 2020-09-15 RX ADMIN — CHLORDIAZEPOXIDE HYDROCHLORIDE SCH MG: 25 CAPSULE ORAL at 13:42

## 2020-09-15 RX ADMIN — LOSARTAN POTASSIUM SCH MG: 50 TABLET ORAL at 08:21

## 2020-09-15 RX ADMIN — FUROSEMIDE SCH MG: 40 TABLET ORAL at 17:18

## 2020-09-15 RX ADMIN — CHLORDIAZEPOXIDE HYDROCHLORIDE SCH MG: 25 CAPSULE ORAL at 20:50

## 2020-09-15 RX ADMIN — SODIUM CHLORIDE SCH MG: 9 INJECTION, SOLUTION INTRAVENOUS at 08:21

## 2020-09-15 RX ADMIN — Medication SCH MG: at 08:20

## 2020-09-15 RX ADMIN — Medication SCH UDTAB: at 08:21

## 2020-09-15 RX ADMIN — CHLORDIAZEPOXIDE HYDROCHLORIDE SCH MG: 25 CAPSULE ORAL at 05:21

## 2020-09-15 RX ADMIN — FAMOTIDINE SCH MG: 10 INJECTION INTRAVENOUS at 08:20

## 2020-09-16 VITALS — SYSTOLIC BLOOD PRESSURE: 122 MMHG | DIASTOLIC BLOOD PRESSURE: 68 MMHG

## 2020-09-16 VITALS — DIASTOLIC BLOOD PRESSURE: 75 MMHG | SYSTOLIC BLOOD PRESSURE: 119 MMHG

## 2020-09-16 VITALS — DIASTOLIC BLOOD PRESSURE: 68 MMHG | SYSTOLIC BLOOD PRESSURE: 122 MMHG

## 2020-09-16 VITALS — DIASTOLIC BLOOD PRESSURE: 61 MMHG | SYSTOLIC BLOOD PRESSURE: 108 MMHG

## 2020-09-16 VITALS — DIASTOLIC BLOOD PRESSURE: 72 MMHG | SYSTOLIC BLOOD PRESSURE: 115 MMHG

## 2020-09-16 VITALS — DIASTOLIC BLOOD PRESSURE: 49 MMHG | SYSTOLIC BLOOD PRESSURE: 94 MMHG

## 2020-09-16 RX ADMIN — CHLORDIAZEPOXIDE HYDROCHLORIDE SCH MG: 25 CAPSULE ORAL at 06:30

## 2020-09-16 RX ADMIN — FUROSEMIDE SCH MG: 40 TABLET ORAL at 06:30

## 2023-04-12 NOTE — NUR
SI:   CHEST PAIN,ACS S/P EGD

T. 97.6 HR 88 RR 17 B/P 114/74

2L NC O2 SAT @ 98%

VENOUS DOPPLER= NEGATIVE





IS:   IVF NS @ 100ML/HR

PROTONIX IV

******ICU STATUS******
 CALIN GRIMES received a phone call from Carina and obtained information. Pt was placed at [facility] 
through [facility] interim housing program [phone]. CORNELIO requested application form from 
[facility] for [place] [phone].

-------------------------------------------------------------------------------

Signed:    03/10/20 at 1503 by SABAS GRIMES <Co-Signature Required>

-------------------------------------------------------------------------------
 NOTE



CORNELIO spoke w/ CORNELIO Pham from [facility] [phone] that pt is no longer in her 
program and was dis-enrolled after being admitted to ICU. Sandhya recommended CORNELIO to contact 
Carina Taylor,  from [facility] [phone] 
ext.[phone] ([address], [place] [address]). Per Sandhya, Carina assisted pt placing 
at her program. CORNELIO left a vm to Carina for call back. 



CORNELIO left a vm to the shelter coordinators at [facility] [phone] ext [phone] and [phone] for 
call back. 


-------------------------------------------------------------------------------

Signed:    03/10/20 at 1254 by SABAS GRIMES <Co-Signature Required>

-------------------------------------------------------------------------------
 NOTE



Pt is transferred to ICU on 3/5/2020. CORNELIO and  Elina #[id number] spoke w/ 
pt and assessed pt's needs. Pt has been homeless and was staying at [facility] [id number] Room#209. 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 
a restaurant. Pt receives no income at this time. PT is  and has two adult children 
living in [place]. Pt does not have any friends/family members in [place] There is no emergency 
contact provided. Pt has no POA/AD/POLST. Pt is expressing full code. 



Pt spoke w/ Sandhya Beltran,  at [facility] [phone]. There is a 3 days 
bed hold at the [facility]. Sandhya will try to hold his bed and will notify CORNELIO if she fails to 
do so. CORNELIO will continue to F/U. 




-------------------------------------------------------------------------------

Signed:    03/05/20 at 1716 by SABAS GRIMES <Co-Signature Required>

-------------------------------------------------------------------------------
 NOTE



Pt speaks and understand minimal English. CORNELIO met w/ pt and informed that his bed is still on 
hold. CORNELIO will inform pt if there is any change. Pt verbalized understanding.

-------------------------------------------------------------------------------

Signed:    03/06/20 at 1336 by SABAS GRIMES <Co-Signature Required>

-------------------------------------------------------------------------------
 NOTE



SW attempted to meet w/ pt to assess homelessness. However, pt was sleeping. SW will attempt 
later. 


-------------------------------------------------------------------------------

Signed:    03/04/20 at 1656 by SABAS GRIMES <Co-Signature Required>

-------------------------------------------------------------------------------
 NOTE



SW discussed the case w/ assigned RN that pt is medically cleared and he does not F/U w/ his 
medical issue. SW spoke w/ Gonzales from [facility] [phone] ext.[phone]. Per Gonzales, it 
will be first come first serve and encouraged pt to be there by 4:30pm. The address is [address] 
[place] [address]. Pt relayed information to pt. Pt reports he sees PCP at 
[facility]. SW also provided a community resource packet, the list of social 
security office (for replacing social security card), and the GR application w/ the list of 
DPSS offices in [facility]. Pt also reports he lost his green card. SW encouraged [facility] 
[facility]. 



SW also relayed information that [facility] [address] [place] [address] #[phone] will have the intake at 7pm. Pt verbalized understanding and agreed to 
be discharged to [facility]. CORNELIO provided appropriate clothing i.e pants and a shirt. 







-------------------------------------------------------------------------------

Signed:    03/10/20 at 1614 by SABAS GRIMES <Co-Signature Required>

-------------------------------------------------------------------------------
****LIFE VEST

DR BREWER REQUESTED LIFE VEST FOR PATIENT

 CONTACTED [facility] REP. CROWDER T: [phone]

PLAN IS TO DELIVER LIFE VEST LATER THIS AFTERNOON
**CORRECTED INSURANCE INFO



[facility]USTYN [facility]ONTILLASS[facility]

P: 

F: [phone] 

(FAX CLINICALS ALSO  [phone])
*-* INSURANCE *-*



ALL CLINICALS HAVE BEEN FAXED TO:



AUSTYN DAY

P: 

F: [phone] 

(FAX CLINICALS ALSO  [phone])



ALSO FAXED TO:





[phone]
*-* INSURANCE *-*



ALL CLINICALS HAVE BEEN FAXED TO:



AUSTYN PRICE SHAY

P: 

F: [phone] 

(FAX CLINICALS ALSO  [phone])

-------------------------------------------------------------------------------

Addendum: 03/05/20 at 1130 by ENEIDA PETTY CM

-------------------------------------------------------------------------------

ALSO FAXED TO:



[facility]

[phone]
*-* INSURANCE *-*



ALL CLINICALS HAVE BEEN [facility];



[facility]

P:

F: [phone] (FAX CLINICALS HERE AND  [phone])
*-* INSURANCE *-*



ALL CLINICALS HAVE BEEN [facility];



[facility]

P:

F: [phone] (FAX CLINICALS HERE AND  [phone])
*-* INSURANCE *-*



ALL CLINICALS HAVE BEEN FA[facility]ED TO;



[facility]

P:

F: [phone] (FAX CLINICALS HERE AND  [phone])
*-* INSURANCE *-*



ALL CLINICALS HAVE BEEN [facility]ED TO;



[facility]

P:

F: [phone] (FAX CLINICALS HERE AND  [phone])
*-* INSURANCE *-*



DISCHARGE SUMMARY HAS BEEN FAXED TO:



AUSTYN DAY

P: 

F: [phone] 

(FAX CLINICALS ALSO  [phone])



ALSO FAXED TO:





[phone]
CARDIOLOGY

I just got a consultation with Dr. Lott regarding to doing this stress test tomorrow. He 
requests " need new troponins and it has to be down"
CASE MANAGEMENT:REVIEW



3/10/20

SI: AC/CHR CHF EF 30%.  NSVT

ACS/ELEVATED TROPONIN

97.9   95  20  120/76  99% ON RA

H/H-8.3/25.3    MAG-1.7



IS: ALDACTONE PO QD

COREG PO QD

CARAFATE PO QID

COZAAR PO QD

K-DUR PO QD

IV REGLAN Q6HRS

IV PROTONIX Q12

**: TELEMETRY STATUS





PLAN:

CARDIOLOGIST IS RECOMMENDING LIFE VEST

WILL NEED AUTHORIZATION FROM HEALTH PLAN ~ LEFT MESSAGE FOR DEE DAY @ [facility]
CASE MANAGEMENT:REVIEW



3/4/20

SI: AC/CHR CHF. NSVT

ACS/ELEVATED TROPONIN

98.1   101  18  150/87  94% ON RA

K-3.1   CA-8.4  TBILI+1.5   AST/ALT+185/190   TROPONIN(+) 0.076



IS: HEPARIN GTT

K-DUR PO QD

IV PROTONIX QD

COREG PO QD

COZAAR PO QD

LASIX PO QD

IV DILAUDID Q4HRS PRN

**: TELEMETRY STATUS



PLAN:

STRESS TEST ORDERED ~ WILL BE DONE TOMORROW

NEEDS TO TRANSFER FOR CARDIAC CATH ~ CEDARS?

**LEFT VMM FOR DEE DAY AT [facility] REGARDING TRANSFER
CASE MANAGEMENT:REVIEW



3/5/20

SI: AC/CHR CHF. NSVT

ACS/ELEVATED TROPONIN

97.7   65  20  134/58  96% ON RA

WBC+15.0   K-3.3   BUN+23   AST/ALT+127/157



IS: HEPARIN GTT

K-DUR PO QD

IV PROTONIX QD

COREG PO QD

COZAAR PO QD

LASIX PO QD

IV DILAUDID Q4HRS PRN

**: TELEMETRY STATUS



SI: LARGE EMESIS OF BRIGHT RED BLOOD



IS: TRANSFERRED TO ICU

PROTONIX GTT

TO GI LAB FOR STAT EGD

**: ICU STATUS
CASE MANAGEMENT:REVIEW



3/9/20

SI: AC/CHR CHF EF 30%.  NSVT

ACS/ELEVATED TROPONIN

97.2    88  20  130/87  100% ON RA

H/H-8.3/24.4   PLT-141   AST+45



IS: IVF@75/HR

ALDACTONE PO QD

COREG PO QD

CARAFATE PO QID

**: TELEMETRY STATUS





PLAN:

STRESS TEST 

MAY NEED LIFE VEST
CASE MANAGEMENT:REVIEW



65 YR OLD MALE BIBA FROM BUS STOP



PMH: PACEMAKER



CC; CHEST PAIN



SI: ACS

98.2   110  16  168/96  96% ON RA

TBILI+1.6   DBILI+0.4   AST/ALT+485/369    TROPONIN(+) 0.109 AND 0.117



IS: ASA PO GIVEN PTA

NITRO X2 GIVEN PTA

IV MORPHINE 

IV METOPROLOL X3

IV LASIX

CHEST XRAY

**: TO TELEMETRY 



PLAN:

CT ABD/PELVIS

2DECHO

ABD US
ED Nurse Note:

mrsa vre cre swab collected; sent down to lab. belongings list completed.
ED Nurse Note:

received patient from nikos louis. patient in bed with no acute distress. us at 
bedside. vss.
ED Nurse Note:blood sent to labs and IV meds given
ED Nurse Note:pt. was BIBA from the bus stop with c/o chest pain since 
yesterday, pt. is A/Ox4, ambulatory with walker, pt. has left chest pacemaker
ED Nurse Note:repeat troponin sent to labs
ED Nurse Note:second dose out x3 order of metoprolol IV was given
HAND-OFF: 

Report given to ASTER Gore. Plan of care endorsed.
HAND-OFF: 

Report given to ASTER Hector. Pt remains stable at this time.
HAND-OFF: 

Report given to ASTER Henry. Plan of care endorsed.
HAND-OFF: 

Report given to ASTER Mcneill. Patient in bed sleeping. No signs of distress or pain noted at 
this time. Plan of care endorsed.
HAND-OFF: 

Report given to ASTER Rodriguez. Patient is not in distress, laying on bed comfortable. Plan 
of care discussed.
HAND-OFF: 

Report given to ASTER Sequeira. Endorsed plan of care.
HAND-OFF: 

Report given to ASTRE Hector. Plan of care endorsed.
HAND-OFF: 

Report given to Aldo RODARTE. Pt remains stable.
HAND-OFF: 

Report given to Matias RODARTE.
HAND-OFF: 

Report given to ROSEMARIE CEDILLO PT IN NO ACUTE DISTRESS
Homeless Discharge:



Patient is being discharged from medical care.  Awake, alert and oriented x4.  After care 
instructions, including referral to community resources were given.  Patient verbalized 
understanding of After care instructions; at this time patient does not request medications, 
equipment. Social service spoke to the patient and discharge placement given to the patient. 
The patient will be discharged to [address] [place] [address]. Taxi voucher provided 
to the patient and RN called the taxi for the patient to be picked up to the address. 
Patient signed patient consent in the medical record for patient destination upon discharge. 
 All medical devices such as IV, cardiac monitor and ID band were removed.  Patient 
ambulated out with all personal belongings with steady gait with his own walker.
NURSE NOTES:



Per Brian from Cardiology department, Dr. Lott is requesting for additional Troponin lab 
order since he might not perform stress test procedure if patient's Troponin level does not 
come down. Noted and carried out.
NURSE NOTES:



Received callback from Dr. Muñoz. No new orders received at this time. 

-------------------------------------------------------------------------------

Addendum: 03/05/20 at 0609 by STEW MARX RN RN

-------------------------------------------------------------------------------

*radha CARRASQUILLO for Dr. Cuellar
NURSE NOTES:



Received patient report from ASTER Henry. Patient in bed, finished eating his dinner. Patient 
resting comfortably, no signs of distress or pain. IV sites checked, no signs of erythema, 
infiltration or bleeding. Connected to Heparin Drip. Bed in the lowest position, bed alarm 
on, side rails up x 2, brakes on. Will continue plan of care.
NURSE NOTES:

FAUSTINO Vazquezat bedside,updated re pt's status,will continue to monitor pt.
NURSE NOTES:

LATE ENTRY:

RECEIVED REPORT FROM JADE CEDILLO WENT DOWN TO GI LAB FOR EGD. PT TRANSFERRED TO ICU FOR 
ACTIVE GI BLEEDING.  VS ON MONITOR , BP 89/53, RR 22, 02SAT 100% ON ROOM AIR. A/OX2. 
MEET. LUNGS CLEAR, ABDOMEN DISTENDED, BOWEL SOUNDS HYPOACTIVE. NO BM AT THIS TIME. URINAL 
AT BEDSIDE. BILATERAL UPPER AND LOWER PULSES WEAK. IV ACCESS LT HAND 22G, LT FA 22G LEAKING 
AND RT  20G. RUNNING NS AT 75ML/HR. NPO.  SKIN INTACT. CALL LIGHT IN REACH, BED ALARM ON.  
SIDE RAIL X 2. EDUCATION ON PLAN OF CARE. WILL CONTINUE TO MONITOR.
NURSE NOTES:

LATE ENTRY:

VS ON MONITOR , /48, RR 17, 02SAT 97% ON ROOM AIR. A/OX2. MEET. LUNGS CLEAR, 
ABDOMEN DISTENDED, BOWEL SOUNDS HYPOACTIVE. NO BM AT THIS TIME.   IV ACCESS LT HAND 22G AND 
RT  20G. RUNNING NS AT 75ML/HR. NPO.  SKIN INTACT. CALL LIGHT IN REACH, BED ALARM ON.  SIDE 
RAIL X 2.  WILL CONTINUE TO MONITOR.
NURSE NOTES:

Observed pt asleep in bed. No signs/symptoms of acute distress noted at this time. Will 
continue plan of care.
NURSE NOTES:

Paged [facility] pipeline and spoke with Barbie reported PTT result of 89, was told to order PTT 3/5 
tomorrow 0400 and maintain current rate.
NURSE NOTES:

Patient in bed sleeping comfortably. no s/s of acute distress noted. On 2L oxygen via N/C 
satting 100%.  NPO. On Protonix drip 25cc/hr.No complain of pain or discomfort. no hematuria 
noted. pt able to turned and repositioned in bed.  frequent visual checks continued. no 
fever. no n/v. no diarrhea. will continued plan of care.
NURSE NOTES:

Patient in bed sleeping comfortably. no s/s of acute distress noted. On 2L oxygen via N/C 
satting 100%.  no moaning no facial grimaces. pt able to turned and repositioned in bed. 
left lower leg with hyperpigmentation brownish skin.  frequent visual checks continued. no 
fever. no n/v. no diarrhea. will continued plan of care
NURSE NOTES:

Patient stated, "I lived in a hotel [address] [place]" Patient could not recall the 
name of the hotel. Searched [facility] in the [facility] and [facility] [address] [facility] Rosemary who confirmed that patient currently resides 
there. Will endorsed to AM RN to notify  in AM. 

-------------------------------------------------------------------------------

Addendum: 03/04/20 at 0518 by Jennifer Samayoa RN

-------------------------------------------------------------------------------

Ordered Social service consult for homeless as per protocol.
NURSE NOTES:

Patient uses bedside commode with x1 dark medium soft BM, with x 1 urinate with dark genna 
urine. Offered  pt  to get clean but per patient he can clean himself. NPO at this time. 
Frequent visual checks continued. No s/s of acute distress noted. Encouraged patient to 
verbalize needs,fears and feelings to staff. call light within easy reach.
NURSE NOTES:

Per monitor tech, patient has 5 beats of V tach on the monitor, 93 bpm. Immediately went to 
patient's room, patient is asleep, arousable to name. No signs of acute distress. Patient 
complained of chest pain, nonradiating, with a pain scale of 7/10. Offered oxygen. Comfort 
care provided. PRN pain medication given. Will continue to monitor.
NURSE NOTES:

Pt provided with materials for bed bath, oral care and linen change. Pt performed care with 
minimal assistance and remains resting in bed; bed remains in the lowest position with 
safety wheels engaged, side rails up x3, call light within reach and bed alarm activated. 
Will continue plan of care. Will continue to monitor.
NURSE NOTES:

Pt served clear liquid diet for lunch,tolerated well no vomiting  or abd pain presented.
NURSE NOTES:

Received pt and report from ASTER Hector. Observed pt resting in bed with both eyes open and 
watching television. Pt is A/Ox4. Cardiac monitor is in placed; pt is NSR. IV site intact, 
asymptomatic, and patent; running NS @100cc/hr. Bed is in the lowest position and locked. 
Call light and bedside table is within reach. No signs/symptoms of acute distress noted at 
this time. Will continue plan of care.
NURSE NOTES:

Received report Krunal RODARTE. Pt in bed awake and orientedx4 sitting in a chair and able to make 
needs known. IV site in right upper shoulder 22G running with NS@100ml/hr patent and 
asymptomatic. Denied any pain. No c/o SOB. Dr. Monique notified of K+ 3.4 today. No new order 
received. Will continue to plan of care.
NURSE NOTES:

Received report from ASTER Rodriguez. Patient in bed resting, no active s/s cardiac, 
respiratory distress noticed at this time. Patient AOx4, on room air, IV on left FA 22G, 
left hand 22G, asymptomatic, patent, intact. IVF running as prescribed rate. Heparin drip on 
left FA rate of 28.195 ml/h, 14 unit/kg/hr. No s/s bleeding at this time. Bed in lowest 
position, side rails upx2, call light within reach. Will continue to monitor.
NURSE NOTES:

Received report from Aldo RODARTE. Pt in bed awake and orientedx4. No c/o pain. Resting 
comfortably in bed. Denied SOB on room air. IV site in right upper shoulder 22G SL patent 
and asymptomatic. Side railsx2 up for safety. Call light within easy reach.  Bed in lowest 
position and locked. Will continue to plan of care.
NURSE NOTES:

Received report from Eva/ASTER Ryder. Patient is awake, lying in semi farooq's; resting 
comfortably. A/Ox4. Primarily Ukrainian speaking. Denies pain at this time. No signs of acute 
distress noted. Checked IV site and flushed with ongoing heparin drip as prescribed. No 
erythema, bleeding or infiltration noted. Bed at lowest position, brakes on, siderailsx3. 
Call light within reach. Will continue to monitor.
NURSE NOTES:

Received report from Félix Gonzalez RN. Patient was transferred from ED to Telemetry via gurney 
accompanied by 2 staff members, without any incident. Patient transferred to hospital bed 
via ambulation with assistance. No signs of acute distress noted. A/Ox4. Primarily Portuguese 
speaking. Checked IV site and flushed. No signs of erythema, bleeding or infiltration noted. 
Placed tele box on, SR on the monitor, 90bpm. Body assessment done without any skin issues. 
Belongings list checked done with transferring RN. Walker at bedside. Bed at lowest 
position, brakes on, siderailsx2. Will continue to monitor. Dr. Young placed in 
admitting orders. Noted and carried out.
NURSE NOTES:

Report received from ASTER Hector. Pt is observed resting in bed. Pt noted to be alert and 
oriented x3-4. Pt is able to follow commands and make needs known. Pt assessed for pain; and 
notes right lower leg discomfort r/t cellulitis. Pt noted to be SR on cardiac monitor with a 
current HR of 72 and no s/sx of cardiac distress noted. Pt is currently 2L NC and no s/sx of 
acute distress noted. Right hand 22g IV catheter noted which remain asymptomatic, patent and 
intact. Ns currently infusing at 100mL/hr as ordered. Skin remains intact. Diagnostics 
reviewed and pt agrees to safety contract, will use call light despite being ambulatory. Pt 
remains resting in bed; bed remains in the lowest position with safety wheels engaged, side 
rails up x3, call light within reach and bed alarm activated. Will continue plan of care. 
Will continue to monitor.
NURSE NOTES:

Report received from Rachel Beck RN.Pt awake,awake alert resting quietly in bed noted no 
resp distress denies any pain or vomiting ,SR on the monitor, kept NPO but can have  ice 
chips ,skin warm and dry IV sites x2 intact Lt hand and RH with Protonix 40 mg IV drip at 
25ml/hr,and NS at 100 ml/hr,SR up x2 HOB elevated,bed lock in lowest position,will continue 
with plans of care.
NURSE NOTES:

Resting throughout the night. No significant change of condition noted. Will continue to 
monitor.
NURSE NOTES:

Resting throughout the night. No significant change of condition noted. Will continue to 
monitor.
NURSE NOTES:

patient in bed awake,alert able to verbalize needs to staff. On 2L oxygen via N/C satting 
100%. HOB elevated. Denies any pain or discomfort. Abdomen large and distended. no s/s of 
hypo/hyperglycemia. IV intact infusing NS at 100cc/hr. Instructed patient to use call light 
for assistance. bed alarm on. bed locked and in low position. will continue plan of care.
NURSE NOTES:

patient in bed sleeping comfortably. no s/s of acute distress noted. no moaning no facial 
grimaces. frequent visual checks continued. no fever. no n/v. no diarrhea.will continued 
plan of care.
NURSE NOTES:

patient in bed sleeping comfortably. no s/s of acute distress noted. no moaning no facial 
grimaces. pt able to turned and repositioned in bed. left lower leg with hyperpigmentation 
brownish skin.  frequent visual checks continued. no fever. no n/v. no diarrhea.will 
continued plan of care
NURSE NOTES:

recvd report from ASTER Rodriguez. Pt appears anxious. per Night RN[facility] 
will continue dr for alternative anxiety meds/

-------------------------------------------------------------------------------

Addendum: 03/03/20 at 0811 by DOE WANG RN

-------------------------------------------------------------------------------

Pt is AOx4, pt is on room air. pt is NPO and has left hand 20g running D51/2NS @ 75/hr. Bed 
in lowest position, call light within reach, will continue with plan of care
NURSE NOTES:

verbalized feeling hungry,pt  is NPO except  ice chips and medic.Pt given ice chips until 
further order.
NURSE NOTES:

zofran ivp given. pt vomiting blood and sputum. abdomen discomfort 4/10.
NURSE NOTES:   Received patient from Min RN.  Patient sitting up on the bed, on room air, no 
signs of respiratory distress.  Alert and oriented x4.  Calm and cooperative.  Call light 
within reach.  Right upper arm 22 gauge piv intact, patent, dressing dry, no signs of 
infiltration.  Bed in low position, locked, call light within reach.
NURSE NOTES:  IVF off.  Ordered while patient was NPO.  Patient is no longer NPO.
NURSE NOTES:  Received patient from Krunal RODARTE.  Patient sitting at bedside, on room air, no 
signs of respiratory distress.  Alert and oriented x4.  Calm and cooperative.  Call light 
within reach.
RD ASSESSMENT & RECOMMENDATIONS

SEE CARE ACTIVITY FOR COMPLETE ASSESSMENT



DAILY ESTIMATED NEEDS:

Needs based on liver dysfunction, cardiac/ 72kg abw 

25-30  kcals/kg 

3852-1743  total kcals

1-1.5  g protein/kg

  g total protein 

25-30  mL/kg

0307-0946  total fluid mLs



NUTRITION DIAGNOSIS:

Altered nutrition related lab values R/T liver dysfunction as evidenced by

elev LFTs and T bili 1.3.





CURRENT DIET:CARDIAC    

 



PO DIET RECOMMENDATIONS:

LOW NA, LOW FAT/ SOFT 



 



ADDITIONAL RECOMMENDATIONS:

* Standing wt for accurate CBW 

* Monitor H/H: active GIB, no more bleeding at this time per MD 

* Monitor lytes, replete as needed (low K) 

* Monitor BGs, need for carb controlled diet: ( 158) 

* Check A1C for eval of BG control
TRANSFER TO FLOOR:

Patient transferred to 

2E Telemetry room 204-1, per bed awake,alert oriented in no acute distress.   Report given 
to Krunal CONDON  Belongings  given to receiving RN.
TRANSFER TO FLOOR:

Patient transferred to [facility] 208-2 as ordered, per claudette gray.   Report given 
to lore louis. patient stable for transfer. patient transferred to floor via 
gurney with paul and rn. belongings and admission packet sent with patient.
Upon arrival to Nuclear Medicine department, pt had episode of hematemesis. Charge RN Cassie 
notified. Pt returned to unit without completing Myocardial Perfusion Scan.
patient vomited 150 ml fresh blood>>>>>> MADE AWARE>>> HEP DRIP D/C AS ORDERED, 
STRESS TEST PENDING GI CLEARANCE 

PATIENT CONTINUE TO BE NPO , STARTED  IV FLUID  ML / HR  AS ORDERED.

WILL CONTINUE TO MONITOR.
The patient is a 29y Female complaining of abdominal pain.